# Patient Record
Sex: FEMALE | Race: BLACK OR AFRICAN AMERICAN | Employment: OTHER | URBAN - METROPOLITAN AREA
[De-identification: names, ages, dates, MRNs, and addresses within clinical notes are randomized per-mention and may not be internally consistent; named-entity substitution may affect disease eponyms.]

---

## 2017-01-04 DIAGNOSIS — I10 ESSENTIAL HYPERTENSION: Primary | ICD-10-CM

## 2017-01-04 RX ORDER — LISINOPRIL 5 MG/1
TABLET ORAL
Qty: 30 TAB | Refills: 5 | Status: SHIPPED | OUTPATIENT
Start: 2017-01-04 | End: 2017-03-07 | Stop reason: SDUPTHER

## 2017-01-05 RX ORDER — LISINOPRIL 5 MG/1
TABLET ORAL
Qty: 30 TAB | Refills: 3 | Status: SHIPPED | OUTPATIENT
Start: 2017-01-05 | End: 2017-03-07 | Stop reason: SDUPTHER

## 2017-02-15 ENCOUNTER — TELEPHONE (OUTPATIENT)
Dept: ENDOCRINOLOGY | Age: 52
End: 2017-02-15

## 2017-02-15 NOTE — TELEPHONE ENCOUNTER
Patient says she started taking 1/2 pill of Nasina on Monday. Patient says she is is experiencing weight loss in particular muscle mass and thinks  it from medication.

## 2017-02-15 NOTE — TELEPHONE ENCOUNTER
Marino Shiver by itself should not cause weight loss , helps with the sugars and is weight neutral     Is there any thing she is taking with it ?     She can either continue and see what happens or stop for 2 weeks and restart

## 2017-02-16 LAB
ALBUMIN SERPL-MCNC: 4.3 G/DL (ref 3.5–5.5)
ALBUMIN/CREAT UR: 10.1 MG/G CREAT (ref 0–30)
ALBUMIN/GLOB SERPL: 1.5 {RATIO} (ref 1.1–2.5)
ALP SERPL-CCNC: 55 IU/L (ref 39–117)
ALT SERPL-CCNC: 39 IU/L (ref 0–32)
AST SERPL-CCNC: 27 IU/L (ref 0–40)
BILIRUB SERPL-MCNC: 0.4 MG/DL (ref 0–1.2)
BUN SERPL-MCNC: 9 MG/DL (ref 6–24)
BUN/CREAT SERPL: 18 (ref 9–23)
CALCIUM SERPL-MCNC: 10 MG/DL (ref 8.7–10.2)
CHLORIDE SERPL-SCNC: 101 MMOL/L (ref 96–106)
CHOLEST SERPL-MCNC: 107 MG/DL (ref 100–199)
CO2 SERPL-SCNC: 23 MMOL/L (ref 18–29)
CREAT SERPL-MCNC: 0.5 MG/DL (ref 0.57–1)
CREAT UR-MCNC: 89.1 MG/DL
EST. AVERAGE GLUCOSE BLD GHB EST-MCNC: 143 MG/DL
GLOBULIN SER CALC-MCNC: 2.8 G/DL (ref 1.5–4.5)
GLUCOSE SERPL-MCNC: 117 MG/DL (ref 65–99)
HBA1C MFR BLD: 6.6 % (ref 4.8–5.6)
HDLC SERPL-MCNC: 55 MG/DL
INTERPRETATION, 910389: NORMAL
LDLC SERPL CALC-MCNC: 34 MG/DL (ref 0–99)
Lab: NORMAL
MICROALBUMIN UR-MCNC: 9 UG/ML
POTASSIUM SERPL-SCNC: 4.6 MMOL/L (ref 3.5–5.2)
PROT SERPL-MCNC: 7.1 G/DL (ref 6–8.5)
SODIUM SERPL-SCNC: 141 MMOL/L (ref 134–144)
TRIGL SERPL-MCNC: 91 MG/DL (ref 0–149)
TSH SERPL DL<=0.005 MIU/L-ACNC: <0.006 UIU/ML (ref 0.45–4.5)
VLDLC SERPL CALC-MCNC: 18 MG/DL (ref 5–40)

## 2017-02-16 NOTE — TELEPHONE ENCOUNTER
Please see if you cad FT4 , if not she has to com [employer]     This could be the reason for her weight loss - thyroid is fast

## 2017-02-16 NOTE — TELEPHONE ENCOUNTER
Called and informed pt of Dr. Kaiden Lopez note. Pt verbalized understanding and stated she has been taking half a pill for now. She may stop med and restart as advised. She has appt in 3 weeks and will discuss further then.

## 2017-02-23 ENCOUNTER — TELEPHONE (OUTPATIENT)
Dept: ENDOCRINOLOGY | Age: 52
End: 2017-02-23

## 2017-02-23 DIAGNOSIS — R94.6 ABNORMAL THYROID FUNCTION TEST: ICD-10-CM

## 2017-02-23 DIAGNOSIS — R79.89 ABNORMAL THYROID BLOOD TEST: Primary | ICD-10-CM

## 2017-02-23 NOTE — TELEPHONE ENCOUNTER
----- Message from Lon Lynn MD sent at 2/16/2017  5:26 PM EST -----  Please see if you cad FT4 , if not she has to com [email or web address]     This could be the reason for her weight loss - thyroid is fast

## 2017-03-07 ENCOUNTER — OFFICE VISIT (OUTPATIENT)
Dept: ENDOCRINOLOGY | Age: 52
End: 2017-03-07

## 2017-03-07 VITALS
BODY MASS INDEX: 23.71 KG/M2 | SYSTOLIC BLOOD PRESSURE: 132 MMHG | RESPIRATION RATE: 16 BRPM | WEIGHT: 133.8 LBS | HEIGHT: 63 IN | DIASTOLIC BLOOD PRESSURE: 74 MMHG | TEMPERATURE: 98.3 F | HEART RATE: 112 BPM

## 2017-03-07 DIAGNOSIS — Z79.4 UNCONTROLLED TYPE 2 DIABETES MELLITUS WITH HYPERGLYCEMIA, WITH LONG-TERM CURRENT USE OF INSULIN (HCC): ICD-10-CM

## 2017-03-07 DIAGNOSIS — E11.65 UNCONTROLLED TYPE 2 DIABETES MELLITUS WITH HYPERGLYCEMIA, WITH LONG-TERM CURRENT USE OF INSULIN (HCC): ICD-10-CM

## 2017-03-07 DIAGNOSIS — E11.65 UNCONTROLLED TYPE 2 DIABETES MELLITUS WITH HYPERGLYCEMIA, WITHOUT LONG-TERM CURRENT USE OF INSULIN (HCC): ICD-10-CM

## 2017-03-07 DIAGNOSIS — E11.65 TYPE 2 DIABETES MELLITUS WITH HYPERGLYCEMIA, WITHOUT LONG-TERM CURRENT USE OF INSULIN (HCC): ICD-10-CM

## 2017-03-07 DIAGNOSIS — I10 ESSENTIAL HYPERTENSION: ICD-10-CM

## 2017-03-07 DIAGNOSIS — E11.65 TYPE 2 DIABETES MELLITUS WITH HYPERGLYCEMIA, WITHOUT LONG-TERM CURRENT USE OF INSULIN (HCC): Primary | ICD-10-CM

## 2017-03-07 DIAGNOSIS — E05.90 HYPERTHYROIDISM: ICD-10-CM

## 2017-03-07 RX ORDER — LISINOPRIL 5 MG/1
TABLET ORAL
Qty: 30 TAB | Refills: 11 | Status: SHIPPED | OUTPATIENT
Start: 2017-03-07 | End: 2017-10-18 | Stop reason: SDUPTHER

## 2017-03-07 RX ORDER — METFORMIN HYDROCHLORIDE 1000 MG/1
TABLET ORAL
Qty: 60 TAB | Refills: 11 | Status: SHIPPED | OUTPATIENT
Start: 2017-03-07 | End: 2017-10-18 | Stop reason: SDUPTHER

## 2017-03-07 RX ORDER — LANCETS
EACH MISCELLANEOUS
Qty: 100 EACH | Refills: 11 | Status: SHIPPED | OUTPATIENT
Start: 2017-03-07

## 2017-03-07 RX ORDER — ALOGLIPTIN 25 MG/1
25 TABLET, FILM COATED ORAL DAILY
Qty: 30 TAB | Refills: 11 | Status: SHIPPED | OUTPATIENT
Start: 2017-03-07 | End: 2017-11-10 | Stop reason: ALTCHOICE

## 2017-03-07 NOTE — PROGRESS NOTES
[facility]               Reinaldo Post MD        [address] [phone] Fax [phone]  [facility]) [address] [address] [address] [id number] Fax [phone] ( Monday)          Patient Information  Date:3/7/2017  Name : Raven Lindsey 46 y.o.     YOB: 1965         Referred by: Steffany Whittington MD         Chief Complaint   Patient presents with    Diabetes    Medication Management       History of Present Illness: Raven Lindsey is a 46 y.o. female here for follow up of  Type 2 Diabetes Mellitus. She was diagnosed with diabetes in 2013   She  has lost weight   Racing of the heart   TSH was low and FT4 high     She thought Blair Prosper was making her lose weight          She is on Metformin 1000 mg twice daily     She has Apps on her phone to track calories which is helping          Wt Readings from Last 3 Encounters:   03/07/17 133 lb 12.8 oz (60.7 kg)   10/25/16 145 lb 8 oz (66 kg)   05/17/16 155 lb 14.4 oz (70.7 kg)       BP Readings from Last 3 Encounters:   03/07/17 132/74   10/25/16 119/85   05/17/16 121/69           Past Medical History:   Diagnosis Date    [facility]     HTN (hypertension)      Current Outpatient Prescriptions   Medication Sig    lisinopril (PRINIVIL, ZESTRIL) 5 mg tablet TAKE ONE TABLET BY MOUTH DAILY    biotin 2,500 mcg tab Take 1 Tab by mouth daily.  alogliptin (NESINA) 25 mg tablet Take 1 Tab by mouth daily.  metFORMIN (GLUCOPHAGE) 1,000 mg tablet TAKE ONE TABLET BY MOUTH TWICE A DAY WITH MEALS    glucose blood VI test strips (ONETOUCH VERIO) strip Test blood glucose daily    Lancets misc Test blood glucose once daily    ALOE VERA Take 1 Cap by mouth daily. No current facility-administered medications for this visit.       No Known Allergies      Review of Systems:  - Constitutional Symptoms: no fevers, no chills  - Eyes: no blurry vision no double vision  - Cardiovascular: no chest pain ,no palpitations  - Respiratory: no cough no shortness of breath  - Gastrointestinal: no dysphagia no  abdominal pain  - Musculoskeletal: no joint pains no  weakness  - Integumentary: no rashes  -     Physical Examination:   Blood pressure 132/74, pulse (!) 112, temperature 98.3 °F (36.8 °C), temperature source Oral, resp. rate 16, height 5' 3\" (1.6 m), weight 133 lb 12.8 oz (60.7 kg). Estimated body mass index is 23.7 kg/(m^2) as calculated from the following:    Height as of this encounter: 5' 3\" (1.6 m). -   Weight as of this encounter: 133 lb 12.8 oz (60.7 kg). - General: pleasant, no distress, good eye contact  - HEENT: no pallor, no periorbital edema, EOMI  - Neck: supple, no thyromegaly  - Cardiovascular: regular, tachycardia  normal S1 and S2  - Respiratory: clear to auscultation bilaterally  - Gastrointestinal: soft, nontender, nondistended,  BS +  - Musculoskeletal:no acanthosis nigricans  - Neurological: ,alert and oriented  - Psychiatric: normal mood and affect  - Skin: color, texture, turgor normal.       Data Reviewed:     [] Glucose records reviewed. [] See glucose records for details (to be scanned).   [] A1C  [] Reviewed labs  Lab Results   Component Value Date/Time    Hemoglobin A1c 6.6 02/15/2017 10:59 AM    Hemoglobin A1c 6.6 10/18/2016 08:28 AM    Hemoglobin A1c 6.9 05/10/2016 08:41 AM    Glucose 117 02/15/2017 10:59 AM    Glucose  12/01/2014 01:15 PM    Microalb/Creat ratio (ug/mg creat.) 10.1 02/15/2017 10:59 AM    LDL,Direct 64 05/10/2016 08:41 AM    LDL, calculated 34 02/15/2017 10:59 AM    Creatinine 0.50 02/15/2017 10:59 AM      Lab Results   Component Value Date/Time    Cholesterol, total 107 02/15/2017 10:59 AM    HDL Cholesterol 55 02/15/2017 10:59 AM    LDL,Direct 64 05/10/2016 08:41 AM    LDL, calculated 34 02/15/2017 10:59 AM    Triglyceride 91 02/15/2017 10:59 AM     Lab Results   Component Value Date/Time    ALT (SGPT) 39 02/15/2017 10:59 AM    AST (SGOT) 27 02/15/2017 10:59 AM    Alk. phosphatase 55 02/15/2017 10:59 AM    Bilirubin, total 0.4 02/15/2017 10:59 AM     Lab Results   Component Value Date/Time    GFR est  02/15/2017 10:59 AM    GFR est non- 02/15/2017 10:59 AM    Creatinine 0.50 02/15/2017 10:59 AM    BUN 9 02/15/2017 10:59 AM    Sodium 141 02/15/2017 10:59 AM    Potassium 4.6 02/15/2017 10:59 AM    Chloride 101 02/15/2017 10:59 AM    CO2 23 02/15/2017 10:59 AM      Lab Results   Component Value Date/Time    TSH <0.006 02/15/2017 10:59 AM        Assessment/Plan:     1. Type 2 diabetes mellitus with hyperglycemia, without long-term current use of insulin (HCC)        Diabetes:    Lab Results   Component Value Date/Time    Hemoglobin A1c 6.6 02/15/2017 10:59 AM    Hemoglobin A1c (POC) 7.8 12/01/2014 01:15 PM   Nesina,  Metformin  Life style changes, discussed goals of glucose and A1C     Hypertension:  On Lisinopril. Overweight - weight stable  Body mass index is 23.7 kg/(m^2). Vitamin D - dairy intake       Hyperthyroidism -   Uptake and scan   on some supplements according  and weight loss medication ,she denies taking it         There are no Patient Instructions on file for this visit. Follow-up Disposition: Not on File    Thank you for allowing me to participate in the care of this patient.     Apolinar Pino MD

## 2017-03-07 NOTE — PROGRESS NOTES
Alexandra Atkinson is a 46 y.o. female here for   Chief Complaint   Patient presents with    Diabetes    Medication Management       Functional glucose monitor and record keeping system? - yes  Eye exam within last year? - no   Foot exam within last year? - yes April 2016    Lab Results   Component Value Date/Time    Hemoglobin A1c 6.6 02/15/2017 10:59 AM    Hemoglobin A1c (POC) 7.8 12/01/2014 01:15 PM       Wt Readings from Last 3 Encounters:   10/25/16 145 lb 8 oz (66 kg)   05/17/16 155 lb 14.4 oz (70.7 kg)   04/18/16 153 lb 6.4 oz (69.6 kg)     Temp Readings from Last 3 Encounters:   10/25/16 97.9 °F (36.6 °C) (Oral)   05/17/16 98.2 °F (36.8 °C) (Oral)   02/24/16 98.4 °F (36.9 °C) (Oral)     BP Readings from Last 3 Encounters:   10/25/16 119/85   05/17/16 121/69   04/18/16 127/75     Pulse Readings from Last 3 Encounters:   10/25/16 (!) 115   05/17/16 90   04/18/16 83

## 2017-03-07 NOTE — MR AVS SNAPSHOT
Visit Information Date & Time Provider Department Dept. Phone Encounter #  
 3/7/2017  3:15 PM Pearline Severs, MD [facility] End[facility]ology [phone] [id number] Follow-up Instructions Return in about 6 weeks (around 4/18/2017). Upcoming Health Maintenance Date Due Hepatitis C Screening 1965 FOOT EXAM Q1 9/20/1975 Pneumococcal 19-64 Medium Risk (1 of 1 - PPSV23) 9/20/1984 DTaP/Tdap/Td series (1 - Tdap) 9/20/1986 PAP AKA CERVICAL CYTOLOGY 9/20/1986 INFLUENZA AGE 9 TO ADULT 8/1/2016 EYE EXAM RETINAL OR DILATED Q1 1/7/2017 HEMOGLOBIN A1C Q6M 8/15/2017 MICROALBUMIN Q1 2/15/2018 LIPID PANEL Q1 2/15/2018 BREAST CANCER SCRN MAMMOGRAM 11/16/2018 COLONOSCOPY 8/3/2021 Allergies as of 3/7/2017  Review Complete On: 3/7/2017 By: Pearline Severs, MD  
 No Known Allergies Current Immunizations  Never Reviewed No immunizations on file. Not reviewed this visit You Were Diagnosed With   
  
 Codes Comments Type 2 diabetes mellitus with hyperglycemia, without long-term current use of insulin (HCC)    -  Primary ICD-10-CM: E11.65 ICD-9-CM: 250.00, 790.29 Hyperthyroidism     ICD-10-CM: E05.90 ICD-9-CM: 242.90 Vitals BP Pulse Temp Resp Height(growth percentile) Weight(growth percentile) 132/74 (BP 1 Location: Left arm, BP Patient Position: Sitting) (!) 112 98.3 °F (36.8 °C) (Oral) 16 5' 3\" (1.6 m) 133 lb 12.8 oz (60.7 kg) BMI OB Status Smoking Status 23.7 kg/m2 Premenopausal Never Smoker BMI and BSA Data Body Mass Index Body Surface Area  
 23.7 kg/m 2 1.64 m 2 [facility] [address], [address] [phone] Your Updated Medication List  
  
   
This list is accurate as of: 3/7/17  4:09 PM.  Always use your most recent med list.  
  
  
  
  
 Nelson Macanese Take 1 Cap by mouth daily. alogliptin 25 mg tablet Commonly known as:  Devon Wagner Take 1 Tab by mouth daily. biotin 2,500 mcg Tab Take 1 Tab by mouth daily. glucose blood VI test strips strip Commonly known as:  Kayce Ahle Test blood glucose daily Lancets Misc Test blood glucose once daily  
  
 lisinopril 5 mg tablet Commonly known as:  PRINIVIL, ZESTRIL  
TAKE ONE TABLET BY MOUTH DAILY  
  
 metFORMIN 1,000 mg tablet Commonly known as:  GLUCOPHAGE  
TAKE ONE TABLET BY MOUTH TWICE A DAY WITH MEALS Follow-up Instructions Return in about 6 weeks (around 4/18/2017). Introducing [facility]! Tina Gordillo introduces CareWire patient portal. Now you can access parts of your medical record, email your doctor's office, and request medication refills online. 1. In your internet browser, go to [email or web address] 2. Click on the First Time User? Click Here link in the Sign In box. You will see the New Member Sign Up page. 3. Enter your CareWire Access Code exactly as it appears below. You will not need to use this code after youve completed the sign-up process. If you do not sign up before the expiration date, you must request a new code. · CareWire Access Code: [email or web address] Expires: 6/5/2017  4:09 PM 
 
4. Enter the last four digits of your Social Security Number (xxxx) and Date of Birth (mm/dd/yyyy) as indicated and click Submit. You will be taken to the next sign-up page. 5. Create a CareWire ID. This will be your CareWire login ID and cannot be changed, so think of one that is secure and easy to remember. 6. Create a CareWire password. You can change your password at any time. 7. Enter your Password Reset Question and Answer. This can be used at a later time if you forget your password. 8. Enter your e-mail address. You will receive e-mail notification when new information is available in [address] 9. Click Sign Up. You can now view and download portions of your medical record. 10. Click the Download Summary menu link to download a portable copy of your medical information. If you have questions, please visit the Frequently Asked Questions section of the streamit website. Remember, streamit is NOT to be used for urgent needs. For medical emergencies, dial 911. Now available from your iPhone and Android! Please provide this summary of care documentation to your next provider. Your primary care clinician is listed as Adenike Medina. If you have any questions after today's visit, please call [phone].

## 2017-04-10 ENCOUNTER — HOSPITAL ENCOUNTER (OUTPATIENT)
Dept: NUCLEAR MEDICINE | Age: 52
Discharge: HOME | End: 2017-04-10
Attending: INTERNAL MEDICINE
Payer: COMMERCIAL

## 2017-04-10 DIAGNOSIS — E11.65 TYPE 2 DIABETES MELLITUS WITH HYPERGLYCEMIA, WITHOUT LONG-TERM CURRENT USE OF INSULIN (HCC): ICD-10-CM

## 2017-04-10 DIAGNOSIS — E05.90 HYPERTHYROIDISM: ICD-10-CM

## 2017-04-10 DIAGNOSIS — I10 ESSENTIAL HYPERTENSION: ICD-10-CM

## 2017-04-11 ENCOUNTER — HOSPITAL ENCOUNTER (OUTPATIENT)
Dept: ULTRASOUND IMAGING | Age: 52
Discharge: HOME | End: 2017-04-11
Attending: INTERNAL MEDICINE
Payer: COMMERCIAL

## 2017-04-11 ENCOUNTER — HOSPITAL ENCOUNTER (OUTPATIENT)
Dept: NUCLEAR MEDICINE | Age: 52
Discharge: HOME | End: 2017-04-11
Attending: INTERNAL MEDICINE
Payer: COMMERCIAL

## 2017-04-11 DIAGNOSIS — E05.90 HYPERTHYROIDISM: ICD-10-CM

## 2017-04-11 PROCEDURE — 78014 THYROID IMAGING W/BLOOD FLOW: CPT

## 2017-04-12 DIAGNOSIS — E05.90 HYPERTHYROIDISM: Primary | ICD-10-CM

## 2017-04-12 NOTE — TELEPHONE ENCOUNTER
Attempted to call. Unsuccessful. Left msg for Naheed Tomas to give us a call back at the office. A callback number was left.

## 2017-04-12 NOTE — TELEPHONE ENCOUNTER
----- Message from Ishmael Olson MD sent at 4/12/2017  9:18 AM EDT -----  She has overactive thyroid and need medications to control   Methimazole 30 mg in AM - all 3 tabs in AM  Labs BNv TSH ,FT4    FU in 6 - 8 weeks

## 2017-04-13 RX ORDER — METHIMAZOLE 10 MG/1
30 TABLET ORAL DAILY
Qty: 90 TAB | Refills: 5 | Status: SHIPPED | OUTPATIENT
Start: 2017-04-13 | End: 2017-06-12 | Stop reason: SDUPTHER

## 2017-04-13 NOTE — TELEPHONE ENCOUNTER
Per Dr. Henrietta Ferrer, informed pt of result note. Pt verbalized understanding with no further questions or concerns at this time. Transferred pt to  to schedule f/u and labs.   Order placed for pt per verbal order with read back from Dr. Henrietta Ferrer 04/13/17

## 2017-04-14 ENCOUNTER — OFFICE VISIT (OUTPATIENT)
Dept: ENDOCRINOLOGY | Age: 52
End: 2017-04-14

## 2017-04-14 VITALS
WEIGHT: 130.3 LBS | TEMPERATURE: 98.5 F | HEART RATE: 130 BPM | SYSTOLIC BLOOD PRESSURE: 144 MMHG | HEIGHT: 63 IN | BODY MASS INDEX: 23.09 KG/M2 | RESPIRATION RATE: 16 BRPM | DIASTOLIC BLOOD PRESSURE: 73 MMHG

## 2017-04-14 DIAGNOSIS — I10 ESSENTIAL HYPERTENSION: ICD-10-CM

## 2017-04-14 DIAGNOSIS — E11.65 TYPE 2 DIABETES MELLITUS WITH HYPERGLYCEMIA, WITHOUT LONG-TERM CURRENT USE OF INSULIN (HCC): Primary | ICD-10-CM

## 2017-04-14 DIAGNOSIS — E05.00 GRAVES DISEASE: ICD-10-CM

## 2017-04-14 NOTE — PROGRESS NOTES
Carla Duron is a 46 y.o. female here for   Chief Complaint   Patient presents with    Thyroid Problem     would like to discuss yesterdays thyroid scan       Wt Readings from Last 3 Encounters:   03/07/17 133 lb 12.8 oz (60.7 kg)   10/25/16 145 lb 8 oz (66 kg)   05/17/16 155 lb 14.4 oz (70.7 kg)     Temp Readings from Last 3 Encounters:   03/07/17 98.3 °F (36.8 °C) (Oral)   10/25/16 97.9 °F (36.6 °C) (Oral)   05/17/16 98.2 °F (36.8 °C) (Oral)     BP Readings from Last 3 Encounters:   03/07/17 132/74   10/25/16 119/85   05/17/16 121/69     Pulse Readings from Last 3 Encounters:   03/07/17 (!) 112   10/25/16 (!) 115   05/17/16 90

## 2017-04-14 NOTE — MR AVS SNAPSHOT
Visit Information Date & Time Provider Department Dept. Phone Encounter #  
 4/14/2017  1:30 PM Dimas Zavala MD [facility] [phone] [id number] Follow-up Instructions Return in about 2 months (around 6/14/2017). Your Appointments 4/21/2017  9:00 AM  
PHYSICAL with MD Reji Yoo Paigeolga Knight [address] [place] [address]) Appt Note: CPE  
 [address] [place] [address]  
[place] [address]  
  
    
 6/5/2017  8:45 AM  
LAB with CDE NURSE [facility]) Appt Note: lab  
 [address] [place] [address] [address] [facility] [address]  
[phone]  
  
   
 [address] [id number]  
  
    
 6/12/2017 10:15 AM  
ROUTINE CARE with Dimas Zavala MD  
[facility]) Appt Note: fu  thyroid [address] [place] [address] [address] [facility] [address]  
[phone]  
  
   
 [address] [place] [address] Upcoming Health Maintenance Date Due Hepatitis C Screening 1965 FOOT EXAM Q1 9/20/1975 Pneumococcal 19-64 Medium Risk (1 of 1 - PPSV23) 9/20/1984 DTaP/Tdap/Td series (1 - Tdap) 9/20/1986 PAP AKA CERVICAL CYTOLOGY 9/20/1986 INFLUENZA AGE 9 TO ADULT 8/1/2016 EYE EXAM RETINAL OR DILATED Q1 1/7/2017 HEMOGLOBIN A1C Q6M 8/15/2017 MICROALBUMIN Q1 2/15/2018 LIPID PANEL Q1 2/15/2018 BREAST CANCER SCRN MAMMOGRAM 11/16/2018 COLONOSCOPY 8/3/2021 Allergies as of 4/14/2017  Review Complete On: 4/14/2017 By: Dimas Zavala MD  
 No Known Allergies Current Immunizations  Never Reviewed No immunizations on file. Not reviewed this visit You Were Diagnosed With   
  
 Codes Comments Graves disease    -  Primary ICD-10-CM: E05.00 ICD-9-CM: 242.00 Vitals BP Pulse Temp Resp Height(growth percentile) Weight(growth percentile) 144/73 (BP 1 Location: Right arm, BP Patient Position: Sitting) (!) 130 98.5 °F (36.9 °C) (Oral) 16 5' 3\" (1.6 m) 130 lb 4.8 oz (59.1 kg) BMI OB Status Smoking Status 23.08 kg/m2 Premenopausal Never Smoker Vitals History BMI and BSA Data Body Mass Index Body Surface Area 23.08 kg/m 2 1.62 m 2 Preferred Pharmacy Pharmacy Name Phone Emily Zarate [address], [address] [phone] Your Updated Medication List  
  
   
This list is accurate as of: 4/14/17  1:45 PM.  Always use your most recent med list.  
  
  
  
  
 Sammie Heir Take 1 Cap by mouth daily. alogliptin 25 mg tablet Commonly known as:  Jose Salm Take 1 Tab by mouth daily. biotin 2,500 mcg Tab Take 1 Tab by mouth daily. glucose blood VI test strips strip Commonly known as:  Amrik Hamming Test blood glucose daily Lancets Misc Test blood glucose once daily  
  
 lisinopril 5 mg tablet Commonly known as:  PRINIVIL, ZESTRIL  
TAKE ONE TABLET BY MOUTH DAILY  
  
 metFORMIN 1,000 mg tablet Commonly known as:  GLUCOPHAGE  
TAKE ONE TABLET BY MOUTH TWICE A DAY WITH MEALS  
  
 methIMAzole 10 mg tablet Commonly known as:  TAPAZOLE Take 3 Tabs by mouth daily. Follow-up Instructions Return in about 2 months (around 6/14/2017). Please provide this summary of care documentation to your next provider. Your primary care clinician is listed as Thiago Shukla. If you have any questions after today's visit, please call [phone].

## 2017-04-14 NOTE — PROGRESS NOTES
[facility]               Kristine Loomis MD        [address] [phone] Fax [phone]  [facility])        [address] [place] [place] [address] [id number] Fax [phone] ( Monday)          Patient Information  Date:4/15/2017  Name : Juan Silva 46 y.o.     YOB: 1965         Referred by: Vivienne Halsted, MD         Chief Complaint   Patient presents with    Thyroid Problem     would like to discuss yesterdays thyroid scan       History of Present Illness: Juan Silva is a 46 y.o. female here for follow up of  Type 2 Diabetes Mellitus. She was diagnosed with diabetes in 2013   She  has lost weight   Racing of the heart   TSH was low and FT4 high     She thought Jocelyn Duran was making her lose weight          She is on Metformin 1000 mg twice daily     She has Apps on her phone to track calories which is helping          Wt Readings from Last 3 Encounters:   04/14/17 130 lb 4.8 oz (59.1 kg)   03/07/17 133 lb 12.8 oz (60.7 kg)   10/25/16 145 lb 8 oz (66 kg)       BP Readings from Last 3 Encounters:   04/14/17 144/73   03/07/17 132/74   10/25/16 119/85           Past Medical History:   Diagnosis Date    Diabetes ([facility]     HTN (hypertension)      Current Outpatient Prescriptions   Medication Sig    metFORMIN (GLUCOPHAGE) 1,000 mg tablet TAKE ONE TABLET BY MOUTH TWICE A DAY WITH MEALS    lisinopril (PRINIVIL, ZESTRIL) 5 mg tablet TAKE ONE TABLET BY MOUTH DAILY    Lancets misc Test blood glucose once daily    glucose blood VI test strips (ONETOUCH VERIO) strip Test blood glucose daily    methIMAzole (TAPAZOLE) 10 mg tablet Take 3 Tabs by mouth daily.  alogliptin (NESINA) 25 mg tablet Take 1 Tab by mouth daily.  biotin 2,500 mcg tab Take 1 Tab by mouth daily.  ALOE VERA Take 1 Cap by mouth daily. No current facility-administered medications for this visit.       No Known Allergies      Review of Systems:  - Constitutional Symptoms: no fevers, no chills  - Eyes: no blurry vision no double vision  - Cardiovascular: no chest pain ,no palpitations  - Respiratory: no cough no shortness of breath  - Gastrointestinal: no dysphagia no  abdominal pain  - Musculoskeletal: no joint pains no  weakness  - Integumentary: no rashes  -     Physical Examination:   Blood pressure 144/73, pulse (!) 130, temperature 98.5 °F (36.9 °C), temperature source Oral, resp. rate 16, height 5' 3\" (1.6 m), weight 130 lb 4.8 oz (59.1 kg). Estimated body mass index is 23.08 kg/(m^2) as calculated from the following:    Height as of this encounter: 5' 3\" (1.6 m). -   Weight as of this encounter: 130 lb 4.8 oz (59.1 kg). - General: pleasant, no distress, good eye contact  - HEENT: no pallor, no periorbital edema, EOMI  - Neck: supple, no thyromegaly  - Cardiovascular: regular, tachycardia  normal S1 and S2  - Respiratory: clear to auscultation bilaterally  - Gastrointestinal: soft, nontender, nondistended,  BS +  - Musculoskeletal:no acanthosis nigricans  - Neurological: ,alert and oriented  - Psychiatric: normal mood and affect  - Skin: color, texture, turgor normal.       Data Reviewed:     [] Glucose records reviewed. [] See glucose records for details (to be scanned).   [] A1C  [] Reviewed labs  Lab Results   Component Value Date/Time    Hemoglobin A1c 6.6 02/15/2017 10:59 AM    Hemoglobin A1c 6.6 10/18/2016 08:28 AM    Hemoglobin A1c 6.9 05/10/2016 08:41 AM    Glucose 117 02/15/2017 10:59 AM    Glucose  12/01/2014 01:15 PM    Microalb/Creat ratio (ug/mg creat.) 10.1 02/15/2017 10:59 AM    LDL,Direct 64 05/10/2016 08:41 AM    LDL, calculated 34 02/15/2017 10:59 AM    Creatinine 0.50 02/15/2017 10:59 AM      Lab Results   Component Value Date/Time    Cholesterol, total 107 02/15/2017 10:59 AM    HDL Cholesterol 55 02/15/2017 10:59 AM    LDL,Direct 64 05/10/2016 08:41 AM    LDL, calculated 34 02/15/2017 10:59 AM    Triglyceride 91 02/15/2017 10:59 AM     Lab Results   Component Value Date/Time    ALT (SGPT) 39 02/15/2017 10:59 AM    AST (SGOT) 27 02/15/2017 10:59 AM    Alk. phosphatase 55 02/15/2017 10:59 AM    Bilirubin, total 0.4 02/15/2017 10:59 AM     Lab Results   Component Value Date/Time    GFR est  02/15/2017 10:59 AM    GFR est non- 02/15/2017 10:59 AM    Creatinine 0.50 02/15/2017 10:59 AM    BUN 9 02/15/2017 10:59 AM    Sodium 141 02/15/2017 10:59 AM    Potassium 4.6 02/15/2017 10:59 AM    Chloride 101 02/15/2017 10:59 AM    CO2 23 02/15/2017 10:59 AM      Lab Results   Component Value Date/Time    TSH <0.006 02/15/2017 10:59 AM        Assessment/Plan:     1. Type 2 diabetes mellitus with hyperglycemia, without long-term current use of insulin ([address]    2. Graves disease    3. Essential hypertension        Diabetes:    Lab Results   Component Value Date/Time    Hemoglobin A1c 6.6 02/15/2017 10:59 AM    Hemoglobin A1c (POC) 7.8 12/01/2014 01:15 PM   Nesina,  Metformin  Life style changes, discussed goals of glucose and A1C     Hypertension:  On Lisinopril. Overweight - weight stable  Body mass index is 23.08 kg/(m^2). Vitamin D - dairy intake       Graves disease   Methimazole 30 mg 4/17        There are no Patient Instructions on file for this visit. Follow-up Disposition:  Return in about 2 months (around 6/14/2017). Thank you for allowing me to participate in the care of this patient.     Lluvia Cramer MD

## 2017-04-15 PROBLEM — E11.65 TYPE 2 DIABETES MELLITUS WITH HYPERGLYCEMIA, WITHOUT LONG-TERM CURRENT USE OF INSULIN (HCC): Status: ACTIVE | Noted: 2017-04-15

## 2017-04-21 ENCOUNTER — OFFICE VISIT (OUTPATIENT)
Dept: FAMILY MEDICINE CLINIC | Age: 52
End: 2017-04-21

## 2017-04-21 VITALS
SYSTOLIC BLOOD PRESSURE: 116 MMHG | BODY MASS INDEX: 22.64 KG/M2 | RESPIRATION RATE: 16 BRPM | HEART RATE: 118 BPM | HEIGHT: 63 IN | OXYGEN SATURATION: 98 % | WEIGHT: 127.8 LBS | DIASTOLIC BLOOD PRESSURE: 76 MMHG | TEMPERATURE: 98.6 F

## 2017-04-21 DIAGNOSIS — I10 ESSENTIAL HYPERTENSION: ICD-10-CM

## 2017-04-21 DIAGNOSIS — E05.90 HYPERTHYROIDISM: Primary | ICD-10-CM

## 2017-04-21 DIAGNOSIS — E11.65 TYPE 2 DIABETES MELLITUS WITH HYPERGLYCEMIA, WITHOUT LONG-TERM CURRENT USE OF INSULIN (HCC): ICD-10-CM

## 2017-04-21 RX ORDER — NEOMYCIN SULFATE, POLYMYXIN B SULFATE, HYDROCORTISONE 3.5; 10000; 1 MG/ML; [USP'U]/ML; MG/ML
SOLUTION/ DROPS AURICULAR (OTIC)
COMMUNITY
Start: 2017-03-29 | End: 2017-06-12 | Stop reason: ALTCHOICE

## 2017-04-21 NOTE — MR AVS SNAPSHOT
Visit Information Date & Time Provider Department Dept. Phone Encounter #  
 4/21/2017  9:00 AM Berny Patterson MD [address] [facility] [id number] Follow-up Instructions Return in about 3 months (around 7/21/2017). Your Appointments 6/5/2017  8:45 AM  
LAB with CDE NURSE [facility] [address] [place] [address]) Appt Note: lab  
 [address] [place] [address] [address] [facility] [address]  
[phone]  
  
   
 [address] [place] [id number]  
  
    
 6/12/2017 10:15 AM  
ROUTINE CARE with Luiz Kang MD  
[facility] [address] [place] [address]) Appt Note: fu  thyroid [address] [place] [address] [address] [facility] [address]  
[phone]  
  
   
 [address] [place] [address] Upcoming Health Maintenance Date Due Hepatitis C Screening 1965 FOOT EXAM Q1 9/20/1975 Pneumococcal 19-64 Medium Risk (1 of 1 - PPSV23) 9/20/1984 DTaP/Tdap/Td series (1 - Tdap) 9/20/1986 EYE EXAM RETINAL OR DILATED Q1 1/7/2017 HEMOGLOBIN A1C Q6M 8/15/2017 MICROALBUMIN Q1 2/15/2018 LIPID PANEL Q1 2/15/2018 BREAST CANCER SCRN MAMMOGRAM 11/16/2018 PAP AKA CERVICAL CYTOLOGY 7/19/2019 COLONOSCOPY 8/3/2021 Allergies as of 4/21/2017  Review Complete On: 4/21/2017 By: Berny Patterson MD  
 No Known Allergies Current Immunizations  Never Reviewed No immunizations on file. Not reviewed this visit You Were Diagnosed With   
  
 Codes Comments Hyperthyroidism    -  Primary ICD-10-CM: E05.90 ICD-9-CM: 242.90 Type 2 diabetes mellitus with hyperglycemia, without long-term current use of insulin (HCC)     ICD-10-CM: E11.65 ICD-9-CM: 250.00, 790.29 Essential hypertension     ICD-10-CM: I10 
ICD-9-CM: 401.9 Vitals BP Pulse Temp Resp Height(growth percentile) Weight(growth percentile) 116/76 (BP 1 Location: Left arm, BP Patient Position: Sitting) (!) 118 98.6 °F (37 °C) (Oral) 16 5' 3\" (1.6 m) 127 lb 12.8 oz (58 kg) SpO2 BMI OB Status Smoking Status 98% 22.64 kg/m2 Premenopausal Never Smoker Vitals History BMI and BSA Data Body Mass Index Body Surface Area  
 22.64 kg/m 2 1.61 m 2 Preferred Pharmacy Pharmacy Name Phone [facility] [address], [address] [phone] Your Updated Medication List  
  
   
This list is accurate as of: 4/21/17  9:50 AM.  Always use your most recent med list.  
  
  
  
  
 Marcelle Clause Take 1 Cap by mouth daily. alogliptin 25 mg tablet Commonly known as:  Conchita Fling Take 1 Tab by mouth daily. biotin 2,500 mcg Tab Take 1 Tab by mouth daily. glucose blood VI test strips strip Commonly known as:  Ok Lathe Test blood glucose daily Lancets Misc Test blood glucose once daily  
  
 lisinopril 5 mg tablet Commonly known as:  PRINIVIL, ZESTRIL  
TAKE ONE TABLET BY MOUTH DAILY  
  
 metFORMIN 1,000 mg tablet Commonly known as:  GLUCOPHAGE  
TAKE ONE TABLET BY MOUTH TWICE A DAY WITH MEALS  
  
 methIMAzole 10 mg tablet Commonly known as:  TAPAZOLE Take 3 Tabs by mouth daily. neomycin-polymyxin-hydrocortisone otic solution Commonly known as:  CORTISPORIN We Performed the Following REFERRAL TO ENDOCRINOLOGY [FVQ33 Custom] Comments:  
 eval and treat for hyperthyroid Follow-up Instructions Return in about 3 months (around 7/21/2017). Referral Information Referral ID Referred By Referred To  
  
 [id number] Amee Sprague MD [address] Phone: [phone] Fax: [phone] Visits Status Start Date End Date 1 New Request 4/21/17 4/21/18  If your referral has a status of pending review or denied, additional information will be sent to support the outcome of this decision. Patient Instructions Diabetes Foot Health: Care Instructions Your Care Instructions When you have diabetes, your feet need extra care and attention. Diabetes can damage the nerve endings and blood vessels in your feet, making you less likely to notice when your feet are injured. Diabetes also limits your body's ability to fight infection and get blood to areas that need it. If you get a minor foot injury, it could become an ulcer or a serious infection. With good foot care, you can prevent most of these problems. Caring for your feet can be quick and easy. Most of the care can be done when you are bathing or getting ready for bed. Follow-up care is a key part of your treatment and safety. Be sure to make and go to all appointments, and call your doctor if you are having problems. Its also a good idea to know your test results and keep a list of the medicines you take. How can you care for yourself at home? · Keep your blood sugar close to normal by watching what and how much you eat, monitoring blood sugar, taking medicines if prescribed, and getting regular exercise. · Do not smoke. Smoking affects blood flow and can make foot problems worse. If you need help quitting, talk to your doctor about stop-smoking programs and medicines. These can increase your chances of quitting for good. · Eat a diet that is low in fats. High fat intake can cause fat to build up in your blood vessels and decrease blood flow. · Inspect your feet daily for blisters, cuts, cracks, or sores. If you cannot see well, use a mirror or have someone help you. · Take care of your feet: 
[facility] your feet every day. Use warm (not hot) water. Check the water temperature with your wrists or other part of your body, not your feet. ¨ Dry your feet well. Pat them dry.  Do not rub the skin on your feet too hard. Dry well between your toes. If the skin on your feet stays moist, bacteria or a fungus can grow, which can lead to infection. ¨ Keep your skin soft. Use moisturizing skin cream to keep the skin on your feet soft and prevent calluses and cracks. But do not put the cream between your toes, and stop using any cream that causes a rash. ¨ Clean underneath your toenails carefully. Do not use a sharp object to clean underneath your toenails. Use the blunt end of a nail file or other rounded tool. ¨ Trim and file your toenails straight across to prevent ingrown toenails. Use a nail clipper, not scissors. Use an emery board to smooth the edges. · Change socks daily. Socks without seams are best, because seams often rub the feet. You can find socks for people with diabetes from specialty catalogs. · Look inside your shoes every day for things like gravel or torn linings, which could cause blisters or sores. · Buy shoes that fit well: 
¨ Look for shoes that have plenty of space around the toes. This helps prevent bunions and blisters. ¨ Try on shoes while wearing the kind of socks you will usually wear with the shoes. ¨ Avoid plastic shoes. They may rub your feet and cause blisters. Good shoes should be made of materials that are flexible and breathable, such as leather or cloth. ¨ Break in new shoes slowly by wearing them for no more than an hour a day for several days. Take extra time to check your feet for red areas, blisters, or other problems after you wear new shoes. · Do not go barefoot. Do not wear sandals, and do not wear shoes with very thin soles. Thin soles are easy to puncture. They also do not protect your feet from hot pavement or cold weather. · Have your doctor check your feet during each visit. If you have a foot problem, see your doctor. Do not try to treat an early foot problem at home.  Home remedies or treatments that you can buy without a prescription (such as corn removers) can be harmful. · Always get early treatment for foot problems. A minor irritation can lead to a major problem if not properly cared for early. When should you call for help? Call your doctor now or seek immediate medical care if: 
· You have a foot sore, an ulcer or break in the skin that is not healing after 4 days, bleeding corns or calluses, or an ingrown toenail. · You have blue or black areas, which can mean bruising or blood flow problems. · You have peeling skin or tiny blisters between your toes or cracking or oozing of the skin. · You have a fever for more than 24 hours and a foot sore. · You have new numbness or tingling in your feet that does not go away after you move your feet or change positions. · You have unexplained or unusual swelling of the foot or ankle. Watch closely for changes in your health, and be sure to contact your doctor if: 
· You cannot do proper foot care. Where can you learn more? Go to http://kenzie-jayro.info/. Enter A739 in the search box to learn more about \"Diabetes Foot Health: Care Instructions. \" Current as of: May 23, 2016 Content Version: 11.2 © 5734-4330 [employer]. Care instructions adapted under license by [employer] (which disclaims liability or warranty for this information). If you have questions about a medical condition or this instruction, always ask your healthcare professional. [place] [address] any warranty or liability for your use of this information. Learning About Diabetes and Your Teeth How does diabetes affect your teeth and gums? When you have diabetes, managing blood sugar levels and taking good care of your teeth and gums are both important. When blood sugar levels are high, there's a greater risk for: · Gum (periodontal) disease. · Tooth decay. · Fungal infections in the mouth, like thrush. · Dry mouth, or xerostomia (say \"zee-ruh-STO-tatianna-uh\"). The mouth needs saliva to neutralize the acids in your mouth. These acids can lead to gum disease and tooth decay. Keeping your blood sugar levels in your target range can help prevent problems with the teeth and gums. If you have any problems with your teeth or gums, see your dentist. 
How do you care for your teeth and gums when you have diabetes? · Brush your teeth twice a day. · Floss daily. Make sure to press the floss against your teeth and not your gums. · Check each day for areas where your gums might be red or painful. Be sure to let your dentist know of any sores in your mouth. · See your dentist regularly for professional cleaning of your teeth and to look for gum problems. Many dentists recommend getting checkups twice a year. Remind your dentist that you have diabetes before any work is done. · Don't smoke or use smokeless tobacco. Tobacco use with diabetes can lead to a greater risk of severe gum disease. If you need help quitting, talk to your doctor about stop-smoking programs and medicines. These can increase your chances of quitting for good. Follow-up care is a key part of your treatment and safety. Be sure to make and go to all appointments, and call your doctor if you are having problems. It's also a good idea to know your test results and keep a list of the medicines you take. Where can you learn more? Go to http://kenzie-jayro.info/. Enter H421 in the search box to learn more about \"Learning About Diabetes and Your Teeth. \" 
Current as of: May 23, 2016 Content Version: 11.2 © 6325-5639 [employer]ippy. Care instructions adapted under license by 1[employer] (which disclaims liability or warranty for this information).  If you have questions about a medical condition or this instruction, always ask your healthcare professional. [employer] disclaims any warranty or liability for your use of this information. Tdap (Tetanus, Diphtheria, Pertussis) Vaccine: What You Need to Know Why get vaccinated? Tetanus, diphtheria, and pertussis are very serious diseases. Tdap vaccine can protect us from these diseases. And Tdap vaccine given to pregnant women can protect  babies against pertussis. Tetanus (lockjaw) is rare in the [facility] today. It causes painful muscle tightening and stiffness, usually all over the body. · It can lead to tightening of muscles in the head and neck so you can't open your mouth, swallow, or sometimes even breathe. Tetanus kills about 1 out of 10 people who are infected even after receiving the best medical care. Diphtheria is also rare in the [place] today. It can cause a thick coating to form in the back of the throat. · It can lead to breathing problems, heart failure, paralysis, and death. Pertussis (whooping cough) causes severe coughing spells, which can cause difficulty breathing, vomiting, and disturbed sleep. · It can also lead to weight loss, incontinence, and rib fractures. Up to 2 in 100 adolescents and 5 in 100 adults with pertussis are hospitalized or have complications, which could include pneumonia or death. These diseases are caused by bacteria. Diphtheria and pertussis are spread from person to person through secretions from coughing or sneezing. Tetanus enters the body through cuts, scratches, or wounds. Before vaccines, as many as 200,000 cases of diphtheria, 200,000 cases of pertussis, and hundreds of cases of tetanus were reported in the [place] each year. Since vaccination began, reports of cases for tetanus and diphtheria have dropped by about 99% and for pertussis by about 80%. Tdap vaccine The Tdap vaccine can protect adolescents and adults from tetanus, diphtheria, and pertussis.  One dose of Tdap is routinely given at age 6 or 12. People who did not get Tdap at that age should get it as soon as possible. Tdap is especially important for health care professionals and anyone having close contact with a baby younger than 12 months. Pregnant women should get a dose of Tdap during every pregnancy, to protect the  from pertussis. Infants are most at risk for severe, life-threatening complications from pertussis. Another vaccine, called Td, protects against tetanus and diphtheria, but not pertussis. A Td booster should be given every 10 years. Tdap may be given as one of these boosters if you have never gotten Tdap before. Tdap may also be given after a severe cut or burn to prevent tetanus infection. Your doctor or the person giving you the vaccine can give you more information. Tdap may safely be given at the same time as other vaccines. Some people should not get this vaccine · A person who has ever had a life-threatening allergic reaction after a previous dose of any diphtheria-, tetanus-, or pertussis-containing vaccine, OR has a severe allergy to any part of this vaccine, should not get Tdap vaccine. Tell the person giving the vaccine about any severe allergies. · Anyone who had coma or long repeated seizures within 7 days after a childhood dose of DTP or DTaP, or a previous dose of Tdap, should not get Tdap, unless a cause other than the vaccine was found. They can still get Td. · Talk to your doctor if you: 
¨ Have seizures or another nervous system problem. ¨ Had severe pain or swelling after any vaccine containing diphtheria, tetanus, or pertussis. ¨ Ever had a condition called Guillain-Barré Syndrome (GBS). ¨ Aren't feeling well on the day the shot is scheduled. Risks With any medicine, including vaccines, there is a chance of side effects. These are usually mild and go away on their own. Serious reactions are also possible but are rare. Most people who get Tdap vaccine do not have any problems with it. Mild problems following Tdap 
(Did not interfere with activities) · Pain where the shot was given (about 3 in 4 adolescents or 2 in 3 adults) · Redness or swelling where the shot was given (about 1 person in 5) · Mild fever of at least 100.4°F (up to about 1 in 25 adolescents or 1 in 100 adults) · Headache (about 3 or 4 people in 10) · Tiredness (about 1 person in 3 or 4) · Nausea, vomiting, diarrhea, stomachache (up to 1 in 4 adolescents or 1 in 10 adults) · Chills, sore joints (about 1 person in 10) · Body aches (about 1 person in 3 or 4) · Rash, swollen glands (uncommon) Moderate problems following Tdap (Interfered with activities, but did not require medical attention) · Pain where the shot was given (up to 1 in 5 or 6) · Redness or swelling where the shot was given (up to about 1 in 16 adolescents or 1 in 12 adults) · Fever over 102°F (about 1 in 100 adolescents or 1 in 250 adults) · Headache (about 1 in 7 adolescents or 1 in 10 adults) · Nausea, vomiting, diarrhea, stomachache (up to 1 to 3 people in 100) · Swelling of the entire arm where the shot was given (up to about 1 in 500) Severe problems following Tdap 
(Unable to perform usual activities; required medical attention) · Swelling, severe pain, bleeding and redness in the arm where the shot was given (rare) Problems that could happen after any vaccine: · People sometimes faint after a medical procedure, including vaccination. Sitting or lying down for about 15 minutes can help prevent fainting, and injuries caused by a fall. Tell your doctor if you feel dizzy or have vision changes or ringing in the ears. · Some people get severe pain in the shoulder and have difficulty moving the arm where a shot was given. This happens very rarely. · Any medication can cause a severe allergic reaction.  Such reactions from a vaccine are very rare, estimated at fewer than 1 in a million doses, and would happen within a few minutes to a few hours after the vaccination. As with any medicine, there is a very remote chance of a vaccine causing a serious injury or death. The safety of vaccines is always being monitored. For more information, visit: www.cdc.gov/vaccinesafety. What if there is a serious problem? What should I look for? · Look for anything that concerns you, such as signs of a severe allergic reaction, very high fever, or unusual behavior. Signs of a severe allergic reaction can include hives, swelling of the face and throat, difficulty breathing, a fast heartbeat, dizziness, and weakness. These would usually start a few minutes to a few hours after the vaccination. What should I do? · If you think it is a severe allergic reaction or other emergency that can't wait, call 9-1-1 or get the person to the nearest hospital. Otherwise, call your doctor. · Afterward, the reaction should be reported to the Vaccine Adverse Event Reporting System (VAERS). Your doctor might file this report, or you can do it yourself through the VAERS web site at www.vaers. hhs.gov, or by calling 5-829.941.4152. VAERS does not give medical advice. The National Vaccine Injury Compensation Program 
The National Vaccine Injury Compensation Program (VICP) is a federal program that was created to compensate people who may have been injured by certain vaccines. Persons who believe they may have been injured by a vaccine can learn about the program and about filing a claim by calling 1-142.667.3157 or visiting the [employer] website at www.[facility].gov/vaccinecompensation. There is a time limit to file a claim for compensation. How can I learn more? · Ask your doctor. He or she can give you the vaccine package insert or suggest other sources of information. · Call your local or state health department.  
· Contact the Centers for Disease Control and Prevention (CDC): 
¨ Call 5-138.680.4658 (1-800-CDC-INFO) or 
 ¨ Visit CDC's website at www.cdc.gov/vaccines Vaccine Information Statement (Interim) Tdap Vaccine 
(2/24/15) 42 WAL[address] 355KS-31 [facility] [place] and [facility] [place] Centers for Disease Control and Prevention Many Vaccine Information Statements are available in [place] and other languages. See www.immunize.org/vis. Muchas hojas de información sobre vacunas están disponibles en español y en otros idiomas. Visite www.immunize.org/vis. Care instructions adapted under license by your healthcare professional. If you have questions about a medical condition or this instruction, always ask your healthcare professional. [place] [address] any warranty or liability for your use of this information. Pneumococcal Polysaccharide Vaccine: What You Need to Know Why get vaccinated? Vaccination can protect older adults (and some children and younger adults) from pneumococcal disease. Pneumococcal disease is caused by bacteria that can spread from person to person through close contact. It can cause ear infections, and it can also lead to more serious infections of the: 
· Lungs (pneumonia), · Blood (bacteremia), and 
· Covering of the brain and spinal cord (meningitis). Meningitis can cause deafness and brain damage, and it can be fatal. 
Anyone can get pneumococcal disease, but children under 3years of age, people with certain medical conditions, adults over 72years of age, and cigarette smokers are at the highest risk. About 18,000 older adults die each year from pneumococcal disease in the [place]. Treatment of pneumococcal infections with penicillin and other drugs used to be more effective. But some strains of the disease have become resistant to these drugs. This makes prevention of the disease, through vaccination, even more important. Pneumococcal polysaccharide vaccine (PPSV23) Pneumococcal polysaccharide vaccine (PPSV23) protects against 23 types of pneumococcal bacteria. It will not prevent all pneumococcal disease. PPSV23 is recommended for: · All adults 72years of age and older, · Anyone 2 through 59years of age with certain long-term health problems, 
· Anyone 2 through 59years of age with a weakened immune system, 
· Adults 23 through 59years of age who smoke cigarettes or have asthma. Most people need only one dose of PPSV. A second dose is recommended for certain high-risk groups. People 72 and older should get a dose even if they have gotten one or more doses of the vaccine before they turned 65. Your healthcare provider can give you more information about these recommendations. Most healthy adults develop protection within 2 to 3 weeks of getting the shot. Some people should not get this vaccine · Anyone who has had a life-threatening allergic reaction to PPSV should not get another dose. · Anyone who has a severe allergy to any component of PPSV should not receive it. Tell your provider if you have any severe allergies. · Anyone who is moderately or severely ill when the shot is scheduled may be asked to wait until they recover before getting the vaccine. Someone with a mild illness can usually be vaccinated. · Children less than 3years of age should not receive this vaccine. · There is no evidence that PPSV is harmful to either a pregnant woman or to her fetus. However, as a precaution, women who need the vaccine should be vaccinated before becoming pregnant, if possible. Risks of a vaccine reaction With any medicine, including vaccines, there is a chance of side effects. These are usually mild and go away on their own, but serious reactions are also possible. About half of people who get PPSV have mild side effects, such as redness or pain where the shot is given, which go away within about two days. Less than 1 out of 100 people develop a fever, muscle aches, or more severe local reactions. Problems that could happen after any vaccine: · People sometimes faint after a medical procedure, including vaccination. Sitting or lying down for about 15 minutes can help prevent fainting, and injuries caused by a fall. Tell your doctor if you feel dizzy, or have vision changes or ringing in the ears. · Some people get severe pain in the shoulder and have difficulty moving the arm where a shot was given. This happens very rarely. · Any medication can cause a severe allergic reaction. Such reactions from a vaccine are very rare, estimated at about 1 in a million doses, and would happen within a few minutes to a few hours after the vaccination. As with any medicine, there is a very remote chance of a vaccine causing a serious injury or death. The safety of vaccines is always being monitored. For more information, visit: www.cdc.gov/vaccinesafety/ What if there is a serious reaction? What should I look for? Look for anything that concerns you, such as signs of a severe allergic reaction, very high fever, or unusual behavior. Signs of a severe allergic reaction can include hives, swelling of the face and throat, difficulty breathing, a fast heartbeat, dizziness, and weakness. These would usually start a few minutes to a few hours after the vaccination. What should I do? If you think it is a severe allergic reaction or other emergency that can't wait, call 9-1-1 or get to the nearest hospital. Otherwise, call your doctor. Afterward, the reaction should be reported to the Vaccine Adverse Event Reporting System (VAERS). Your doctor might file this report, or you can do it yourself through the VAERS web site at www.vaers. [facility].gov, or by calling 7-979.644.6598. VAERS does not give medical advice. How can I learn more? · Ask your doctor. He or she can give you the vaccine package insert or suggest other sources of information. · Call your local or state health department. · Contact the Centers for Disease Control and Prevention (CDC): 
¨ Call 2-154.111.2900 (1-800-CDC-INFO) or ¨ Visit CDC's website at www.cdc.gov/vaccines Vaccine Information Statement PPSV Vaccine 
(04/24/2015) Department of Health and [employer] Centers for Disease Control and Prevention Many Vaccine Information Statements are available in [place] and other languages. See www.immunize.org/vis. Hojas de información Sobre Vacunas están disponibles en español y en muchos otros idiomas. [employer]ite [employer]. Care instructions adapted under license by your healthcare professional. If you have questions about a medical condition or this instruction, always ask your healthcare professional. [address] any warranty or liability for your use of this information. Please provide this summary of care documentation to your next provider. Your primary care clinician is listed as Ruth Goss. If you have any questions after today's visit, please call [phone].

## 2017-04-21 NOTE — PROGRESS NOTES
Chief Complaint   Patient presents with    Annual Exam     1. Have you been to the ER, urgent care clinic since your last visit? Hospitalized since your last visit? Yes, Urgent Care 03/23/17, Ear infection    2. Have you seen or consulted any other health care providers outside of the [address] [place] [address] since your last visit? Include any pap smears or colon screening.  No

## 2017-04-21 NOTE — PROGRESS NOTES
Chief Complaint   Patient presents with    Annual Exam     she is a 46y.o. year old female who presents for evalution. She wants to get the opinion of a different doc about taking the methimazole  She had blood work in [place]. She does not want to have to take methimazole the rest of her life. She does not want radioactive iodine either. She wants to see if another doc has another option    Reviewed PmHx, RxHx, FmHx, SocHx, AllgHx and updated and dated in the chart.     Patient Active Problem List    Diagnosis    Type 2 diabetes mellitus with hyperglycemia, without long-term current use of insulin ([facility] [address]    BMI 28.0-28.9,adult    Overweight    Type II diabetes mellitus, uncontrolled ([facility] [address]    Essential hypertension    Vitamin D deficiency    Overweight    Type II or unspecified type diabetes mellitus without mention of complication, uncontrolled       Nurse notes were reviewed and copied and are correct  Review of Systems - negative except as listed above in the HPI    Objective:     Vitals:    04/21/17 0914   BP: 116/76   Pulse: (!) 118   Resp: 16   Temp: 98.6 °F (37 °C)   TempSrc: Oral   SpO2: 98%   Weight: 127 lb 12.8 oz (58 kg)   Height: 5' 3\" (1.6 m)       Physical Examination: General appearance - alert, well appearing, and in no distress and oriented to person, place, and time  Mental status - alert, oriented to person, place, and time  Ears - bilateral TM's and external ear canals normal  Mouth - mucous membranes moist, pharynx normal without lesions  Neck - supple, no significant adenopathy  Lymphatics - no palpable lymphadenopathy, no hepatosplenomegaly  Chest - clear to auscultation, no wheezes, rales or rhonchi, symmetric air entry  Heart - normal rate, regular rhythm, normal S1, S2, no murmurs, rubs, clicks or gallops  Abdomen - soft, nontender, nondistended, no masses or organomegaly  Neurological - alert, oriented, normal speech, no focal findings or movement disorder noted  Musculoskeletal - no joint tenderness, deformity or swelling  Extremities - peripheral pulses normal, no pedal edema, no clubbing or cyanosis  Skin - normal coloration and turgor, no rashes, no suspicious skin lesions noted      Assessment/ Plan:   Efe Stewart was seen today for annual exam.    Diagnoses and all orders for this visit:    Hyperthyroidism  -     REFERRAL TO ENDOCRINOLOGY  Her heart rate is rapid. She just started taking the methimazole yesterday. I explained the heart rate is likely a result of the hyperthyroid. I am making the referal that she is requesting  Type 2 diabetes mellitus with hyperglycemia, without long-term current use of insulin (HCC)  Well controlled now  Essential hypertension     well controlled. No c/o negative side effects from any meds  Follow-up Disposition:  Return in about 3 months (around 7/21/2017). ICD-10-CM ICD-9-CM    1. Hyperthyroidism E05.90 242.90 REFERRAL TO ENDOCRINOLOGY   2. Type 2 diabetes mellitus with hyperglycemia, without long-term current use of insulin (HCC) E11.65 250.00      790.29    3. Essential hypertension I10 401.9        I have discussed the diagnosis with the patient and the intended plan as seen in the above orders. The patient has received an after-visit summary and questions were answered concerning future plans. Medication Side Effects and Warnings were discussed with patient: yes  Patient Labs were reviewed and or requested: yes  Patient Past Records were reviewed and or requested: yes        Patient Instructions        Diabetes Foot Health: Care Instructions  Your Care Instructions    When you have diabetes, your feet need extra care and attention. Diabetes can damage the nerve endings and blood vessels in your feet, making you less likely to notice when your feet are injured. Diabetes also limits your body's ability to fight infection and get blood to areas that need it. If you get a minor foot injury, it could become an ulcer or a serious infection.  With good foot care, you can prevent most of these problems. Caring for your feet can be quick and easy. Most of the care can be done when you are bathing or getting ready for bed. Follow-up care is a key part of your treatment and safety. Be sure to make and go to all appointments, and call your doctor if you are having problems. Its also a good idea to know your test results and keep a list of the medicines you take. How can you care for yourself at home? · Keep your blood sugar close to normal by watching what and how much you eat, monitoring blood sugar, taking medicines if prescribed, and getting regular exercise. · Do not smoke. Smoking affects blood flow and can make foot problems worse. If you need help quitting, talk to your doctor about stop-smoking programs and medicines. These can increase your chances of quitting for good. · Eat a diet that is low in fats. High fat intake can cause fat to build up in your blood vessels and decrease blood flow. · Inspect your feet daily for blisters, cuts, cracks, or sores. If you cannot see well, use a mirror or have someone help you. · Take care of your feet:  [facility] your feet every day. Use warm (not hot) water. Check the water temperature with your wrists or other part of your body, not your feet. ¨ Dry your feet well. Pat them dry. Do not rub the skin on your feet too hard. Dry well between your toes. If the skin on your feet stays moist, bacteria or a fungus can grow, which can lead to infection. ¨ Keep your skin soft. Use moisturizing skin cream to keep the skin on your feet soft and prevent calluses and cracks. But do not put the cream between your toes, and stop using any cream that causes a rash. ¨ Clean underneath your toenails carefully. Do not use a sharp object to clean underneath your toenails. Use the blunt end of a nail file or other rounded tool. ¨ Trim and file your toenails straight across to prevent ingrown toenails.  Use a nail clipper, not scissors. Use an emery board to smooth the edges. · Change socks daily. Socks without seams are best, because seams often rub the feet. You can find socks for people with diabetes from specialty catalogs. · Look inside your shoes every day for things like gravel or torn linings, which could cause blisters or sores. · Buy shoes that fit well:  ¨ Look for shoes that have plenty of space around the toes. This helps prevent bunions and blisters. ¨ Try on shoes while wearing the kind of socks you will usually wear with the shoes. ¨ Avoid plastic shoes. They may rub your feet and cause blisters. Good shoes should be made of materials that are flexible and breathable, such as leather or cloth. ¨ Break in new shoes slowly by wearing them for no more than an hour a day for several days. Take extra time to check your feet for red areas, blisters, or other problems after you wear new shoes. · Do not go barefoot. Do not wear sandals, and do not wear shoes with very thin soles. Thin soles are easy to puncture. They also do not protect your feet from hot pavement or cold weather. · Have your doctor check your feet during each visit. If you have a foot problem, see your doctor. Do not try to treat an early foot problem at home. Home remedies or treatments that you can buy without a prescription (such as corn removers) can be harmful. · Always get early treatment for foot problems. A minor irritation can lead to a major problem if not properly cared for early. When should you call for help? Call your doctor now or seek immediate medical care if:  · You have a foot sore, an ulcer or break in the skin that is not healing after 4 days, bleeding corns or calluses, or an ingrown toenail. · You have blue or black areas, which can mean bruising or blood flow problems. · You have peeling skin or tiny blisters between your toes or cracking or oozing of the skin. · You have a fever for more than 24 hours and a foot sore.   · You have new numbness or tingling in your feet that does not go away after you move your feet or change positions. · You have unexplained or unusual swelling of the foot or ankle. Watch closely for changes in your health, and be sure to contact your doctor if:  · You cannot do proper foot care. Where can you learn more? Go to http://kenzie-jayro.info/. Enter A739 in the search box to learn more about \"Diabetes Foot Health: Care Instructions. \"  Current as of: May 23, 2016  Content Version: 11.2  © 0107-5331 [employer]. Care instructions adapted under license by [employer] (which disclaims liability or warranty for this information). If you have questions about a medical condition or this instruction, always ask your healthcare professional. [address] any warranty or liability for your use of this information. Learning About Diabetes and Your Teeth  How does diabetes affect your teeth and gums? When you have diabetes, managing blood sugar levels and taking good care of your teeth and gums are both important. When blood sugar levels are high, there's a greater risk for:  · Gum (periodontal) disease. · Tooth decay. · Fungal infections in the mouth, like thrush. · Dry mouth, or xerostomia (say \"zee-ruh-STO-tatianna-uh\"). The mouth needs saliva to neutralize the acids in your mouth. These acids can lead to gum disease and tooth decay. Keeping your blood sugar levels in your target range can help prevent problems with the teeth and gums. If you have any problems with your teeth or gums, see your dentist.  How do you care for your teeth and gums when you have diabetes? · Brush your teeth twice a day. · Floss daily. Make sure to press the floss against your teeth and not your gums. · Check each day for areas where your gums might be red or painful. Be sure to let your dentist know of any sores in your mouth.   · See your dentist regularly for professional cleaning of your teeth and to look for gum problems. Many dentists recommend getting checkups twice a year. Remind your dentist that you have diabetes before any work is done. · Don't smoke or use smokeless tobacco. Tobacco use with diabetes can lead to a greater risk of severe gum disease. If you need help quitting, talk to your doctor about stop-smoking programs and medicines. These can increase your chances of quitting for good. Follow-up care is a key part of your treatment and safety. Be sure to make and go to all appointments, and call your doctor if you are having problems. It's also a good idea to know your test results and keep a list of the medicines you take. Where can you learn more? Go to http://kenzie-jayro.info/. Enter H601 in the search box to learn more about \"Learning About Diabetes and Your Teeth. \"  Current as of: May 23, 2016  Content Version: 11.2  © 6611-6602 [employer]. Care instructions adapted under license by [employer] (which disclaims liability or warranty for this information). If you have questions about a medical condition or this instruction, always ask your healthcare professional. [place] [address] any warranty or liability for your use of this information. Tdap (Tetanus, Diphtheria, Pertussis) Vaccine: What You Need to Know  Why get vaccinated? Tetanus, diphtheria, and pertussis are very serious diseases. Tdap vaccine can protect us from these diseases. And Tdap vaccine given to pregnant women can protect  babies against pertussis. Tetanus (lockjaw) is rare in the [facility] today. It causes painful muscle tightening and stiffness, usually all over the body. · It can lead to tightening of muscles in the head and neck so you can't open your mouth, swallow, or sometimes even breathe.  Tetanus kills about 1 out of 10 people who are infected even after receiving the best medical care.  Diphtheria is also rare in the [facility] today. It can cause a thick coating to form in the back of the throat. · It can lead to breathing problems, heart failure, paralysis, and death. Pertussis (whooping cough) causes severe coughing spells, which can cause difficulty breathing, vomiting, and disturbed sleep. · It can also lead to weight loss, incontinence, and rib fractures. Up to 2 in 100 adolescents and 5 in 100 adults with pertussis are hospitalized or have complications, which could include pneumonia or death. These diseases are caused by bacteria. Diphtheria and pertussis are spread from person to person through secretions from coughing or sneezing. Tetanus enters the body through cuts, scratches, or wounds. Before vaccines, as many as 200,000 cases of diphtheria, 200,000 cases of pertussis, and hundreds of cases of tetanus were reported in the [place] each year. Since vaccination began, reports of cases for tetanus and diphtheria have dropped by about 99% and for pertussis by about 80%. Tdap vaccine  The Tdap vaccine can protect adolescents and adults from tetanus, diphtheria, and pertussis. One dose of Tdap is routinely given at age 6 or 15. People who did not get Tdap at that age should get it as soon as possible. Tdap is especially important for health care professionals and anyone having close contact with a baby younger than 12 months. Pregnant women should get a dose of Tdap during every pregnancy, to protect the  from pertussis. Infants are most at risk for severe, life-threatening complications from pertussis. Another vaccine, called Td, protects against tetanus and diphtheria, but not pertussis. A Td booster should be given every 10 years. Tdap may be given as one of these boosters if you have never gotten Tdap before. Tdap may also be given after a severe cut or burn to prevent tetanus infection.   Your doctor or the person giving you the vaccine can give you more information. Tdap may safely be given at the same time as other vaccines. Some people should not get this vaccine  · A person who has ever had a life-threatening allergic reaction after a previous dose of any diphtheria-, tetanus-, or pertussis-containing vaccine, OR has a severe allergy to any part of this vaccine, should not get Tdap vaccine. Tell the person giving the vaccine about any severe allergies. · Anyone who had coma or long repeated seizures within 7 days after a childhood dose of DTP or DTaP, or a previous dose of Tdap, should not get Tdap, unless a cause other than the vaccine was found. They can still get Td. · Talk to your doctor if you:  ¨ Have seizures or another nervous system problem. ¨ Had severe pain or swelling after any vaccine containing diphtheria, tetanus, or pertussis. ¨ Ever had a condition called Guillain-Barré Syndrome (GBS). ¨ Aren't feeling well on the day the shot is scheduled. Risks  With any medicine, including vaccines, there is a chance of side effects. These are usually mild and go away on their own. Serious reactions are also possible but are rare. Most people who get Tdap vaccine do not have any problems with it.   Mild problems following Tdap  (Did not interfere with activities)  · Pain where the shot was given (about 3 in 4 adolescents or 2 in 3 adults)  · Redness or swelling where the shot was given (about 1 person in 5)  · Mild fever of at least 100.4°F (up to about 1 in 25 adolescents or 1 in 100 adults)  · Headache (about 3 or 4 people in 10)  · Tiredness (about 1 person in 3 or 4)  · Nausea, vomiting, diarrhea, stomachache (up to 1 in 4 adolescents or 1 in 10 adults)  · Chills, sore joints (about 1 person in 10)  · Body aches (about 1 person in 3 or 4)  · Rash, swollen glands (uncommon)  Moderate problems following Tdap  (Interfered with activities, but did not require medical attention)  · Pain where the shot was given (up to 1 in 5 or 6)  · Redness or swelling where the shot was given (up to about 1 in 16 adolescents or 1 in 12 adults)  · Fever over 102°F (about 1 in 100 adolescents or 1 in 250 adults)  · Headache (about 1 in 7 adolescents or 1 in 10 adults)  · Nausea, vomiting, diarrhea, stomachache (up to 1 to 3 people in 100)  · Swelling of the entire arm where the shot was given (up to about 1 in 500)  Severe problems following Tdap  (Unable to perform usual activities; required medical attention)  · Swelling, severe pain, bleeding and redness in the arm where the shot was given (rare)  Problems that could happen after any vaccine:  · People sometimes faint after a medical procedure, including vaccination. Sitting or lying down for about 15 minutes can help prevent fainting, and injuries caused by a fall. Tell your doctor if you feel dizzy or have vision changes or ringing in the ears. · Some people get severe pain in the shoulder and have difficulty moving the arm where a shot was given. This happens very rarely. · Any medication can cause a severe allergic reaction. Such reactions from a vaccine are very rare, estimated at fewer than 1 in a million doses, and would happen within a few minutes to a few hours after the vaccination. As with any medicine, there is a very remote chance of a vaccine causing a serious injury or death. The safety of vaccines is always being monitored. For more information, visit: www.cdc.gov/vaccinesafety. What if there is a serious problem? What should I look for? · Look for anything that concerns you, such as signs of a severe allergic reaction, very high fever, or unusual behavior. Signs of a severe allergic reaction can include hives, swelling of the face and throat, difficulty breathing, a fast heartbeat, dizziness, and weakness. These would usually start a few minutes to a few hours after the vaccination. What should I do?   · If you think it is a severe allergic reaction or other emergency that can't wait, call 9-1-1 or get the person to the nearest hospital. Otherwise, call your doctor. · Afterward, the reaction should be reported to the Vaccine Adverse Event Reporting System (VAERS). Your doctor might file this report, or you can do it yourself through the VAERS web site at www.vaers. [facility].gov, or by calling 1-765.861.3322. VAERS does not give medical advice. The National Vaccine Injury Compensation Program  The National Vaccine Injury Compensation Program (VICP) is a federal program that was created to compensate people who may have been injured by certain vaccines. Persons who believe they may have been injured by a vaccine can learn about the program and about filing a claim by calling 0-822.898.2396 or visiting the [employer] Benefit Plans website at www.[facility].gov/vaccinecompensation. There is a time limit to file a claim for compensation. How can I learn more? · Ask your doctor. He or she can give you the vaccine package insert or suggest other sources of information. · Call your local or state health department. · Contact the Centers for Disease Control and Prevention (CDC):  ¨ Call 7-138.131.4753 (1-800-CDC-INFO) or  ¨ Visit CDC's website at www.cdc.gov/vaccines  Vaccine Information Statement (Interim)  Tdap Vaccine  (2/24/15)  42 [address] 480EA-15  Department of Health and Human Services  Centers for Disease Control and Prevention  Many Vaccine Information Statements are available in [place] and other languages. See www.immunize.org/vis. Muchas hojas de información sobre vacunas están disponibles en español y en otros idiomas. Visite www.immunize.org/vis. Care instructions adapted under license by your healthcare professional. If you have questions about a medical condition or this instruction, always ask your healthcare professional. [place] [address] any warranty or liability for your use of this information. Pneumococcal Polysaccharide Vaccine: What You Need to Know  Why get vaccinated?   Vaccination can protect older adults (and some children and younger adults) from pneumococcal disease. Pneumococcal disease is caused by bacteria that can spread from person to person through close contact. It can cause ear infections, and it can also lead to more serious infections of the:  · Lungs (pneumonia),  · Blood (bacteremia), and  · Covering of the brain and spinal cord (meningitis). Meningitis can cause deafness and brain damage, and it can be fatal.  Anyone can get pneumococcal disease, but children under 3years of age, people with certain medical conditions, adults over 72years of age, and cigarette smokers are at the highest risk. About 18,000 older adults die each year from pneumococcal disease in the [place]. Treatment of pneumococcal infections with penicillin and other drugs used to be more effective. But some strains of the disease have become resistant to these drugs. This makes prevention of the disease, through vaccination, even more important. Pneumococcal polysaccharide vaccine (PPSV23)  Pneumococcal polysaccharide vaccine (PPSV23) protects against 23 types of pneumococcal bacteria. It will not prevent all pneumococcal disease. PPSV23 is recommended for:  · All adults 72years of age and older,  · Anyone 2 through 59years of age with certain long-term health problems,  · Anyone 2 through 59years of age with a weakened immune system,  · Adults 23 through 59years of age who smoke cigarettes or have asthma. Most people need only one dose of PPSV. A second dose is recommended for certain high-risk groups. People 72 and older should get a dose even if they have gotten one or more doses of the vaccine before they turned 65. Your healthcare provider can give you more information about these recommendations. Most healthy adults develop protection within 2 to 3 weeks of getting the shot.   Some people should not get this vaccine  · Anyone who has had a life-threatening allergic reaction to PPSV should not get another dose. · Anyone who has a severe allergy to any component of PPSV should not receive it. Tell your provider if you have any severe allergies. · Anyone who is moderately or severely ill when the shot is scheduled may be asked to wait until they recover before getting the vaccine. Someone with a mild illness can usually be vaccinated. · Children less than 3years of age should not receive this vaccine. · There is no evidence that PPSV is harmful to either a pregnant woman or to her fetus. However, as a precaution, women who need the vaccine should be vaccinated before becoming pregnant, if possible. Risks of a vaccine reaction  With any medicine, including vaccines, there is a chance of side effects. These are usually mild and go away on their own, but serious reactions are also possible. About half of people who get PPSV have mild side effects, such as redness or pain where the shot is given, which go away within about two days. Less than 1 out of 100 people develop a fever, muscle aches, or more severe local reactions. Problems that could happen after any vaccine:  · People sometimes faint after a medical procedure, including vaccination. Sitting or lying down for about 15 minutes can help prevent fainting, and injuries caused by a fall. Tell your doctor if you feel dizzy, or have vision changes or ringing in the ears. · Some people get severe pain in the shoulder and have difficulty moving the arm where a shot was given. This happens very rarely. · Any medication can cause a severe allergic reaction. Such reactions from a vaccine are very rare, estimated at about 1 in a million doses, and would happen within a few minutes to a few hours after the vaccination. As with any medicine, there is a very remote chance of a vaccine causing a serious injury or death. The safety of vaccines is always being monitored.  For more information, visit: www.cdc.gov/vaccinesafety/  What if there is a serious reaction? What should I look for? Look for anything that concerns you, such as signs of a severe allergic reaction, very high fever, or unusual behavior. Signs of a severe allergic reaction can include hives, swelling of the face and throat, difficulty breathing, a fast heartbeat, dizziness, and weakness. These would usually start a few minutes to a few hours after the vaccination. What should I do? If you think it is a severe allergic reaction or other emergency that can't wait, call 9-1-1 or get to the nearest hospital. Otherwise, call your doctor. Afterward, the reaction should be reported to the Vaccine Adverse Event Reporting System (VAERS). Your doctor might file this report, or you can do it yourself through the VAERS web site at www.vaers. [facility].gov, or by calling 0-865.579.6374. VAERS does not give medical advice. How can I learn more? · Ask your doctor. He or she can give you the vaccine package insert or suggest other sources of information. · Call your local or state health department. · Contact the Centers for Disease Control and Prevention (CDC):  ¨ Call 0-655.160.6735 (1-800-CDC-INFO) or  ¨ Visit CDC's website at www.cdc.gov/vaccines  Vaccine Information Statement  PPSV Vaccine  (04/24/2015)  Department of Health and Human Services  Centers for Disease Control and Prevention  Many Vaccine Information Statements are available in [place]ian and other languages. See www.immunize.org/vis. Hojas de información Sobre Vacunas están disponibles en español y en muchos otros idiomas. Visite [email or web address].si. Care instructions adapted under license by your healthcare professional. If you have questions about a medical condition or this instruction, always ask your healthcare professional. [place] [address] any warranty or liability for your use of this information.         The patient verbalizes understanding and agrees with the plan of care        Patient has the advanced directives booklet to review

## 2017-04-21 NOTE — PATIENT INSTRUCTIONS
Diabetes Foot Health: Care Instructions  Your Care Instructions    When you have diabetes, your feet need extra care and attention. Diabetes can damage the nerve endings and blood vessels in your feet, making you less likely to notice when your feet are injured. Diabetes also limits your body's ability to fight infection and get blood to areas that need it. If you get a minor foot injury, it could become an ulcer or a serious infection. With good foot care, you can prevent most of these problems. Caring for your feet can be quick and easy. Most of the care can be done when you are bathing or getting ready for bed. Follow-up care is a key part of your treatment and safety. Be sure to make and go to all appointments, and call your doctor if you are having problems. Its also a good idea to know your test results and keep a list of the medicines you take. How can you care for yourself at home? · Keep your blood sugar close to normal by watching what and how much you eat, monitoring blood sugar, taking medicines if prescribed, and getting regular exercise. · Do not smoke. Smoking affects blood flow and can make foot problems worse. If you need help quitting, talk to your doctor about stop-smoking programs and medicines. These can increase your chances of quitting for good. · Eat a diet that is low in fats. High fat intake can cause fat to build up in your blood vessels and decrease blood flow. · Inspect your feet daily for blisters, cuts, cracks, or sores. If you cannot see well, use a mirror or have someone help you. · Take care of your feet:  [facility] your feet every day. Use warm (not hot) water. Check the water temperature with your wrists or other part of your body, not your feet. ¨ Dry your feet well. Pat them dry. Do not rub the skin on your feet too hard. Dry well between your toes. If the skin on your feet stays moist, bacteria or a fungus can grow, which can lead to infection.   ¨ Keep your skin soft. Use moisturizing skin cream to keep the skin on your feet soft and prevent calluses and cracks. But do not put the cream between your toes, and stop using any cream that causes a rash. ¨ Clean underneath your toenails carefully. Do not use a sharp object to clean underneath your toenails. Use the blunt end of a nail file or other rounded tool. ¨ Trim and file your toenails straight across to prevent ingrown toenails. Use a nail clipper, not scissors. Use an emery board to smooth the edges. · Change socks daily. Socks without seams are best, because seams often rub the feet. You can find socks for people with diabetes from specialty catalogs. · Look inside your shoes every day for things like gravel or torn linings, which could cause blisters or sores. · Buy shoes that fit well:  ¨ Look for shoes that have plenty of space around the toes. This helps prevent bunions and blisters. ¨ Try on shoes while wearing the kind of socks you will usually wear with the shoes. ¨ Avoid plastic shoes. They may rub your feet and cause blisters. Good shoes should be made of materials that are flexible and breathable, such as leather or cloth. ¨ Break in new shoes slowly by wearing them for no more than an hour a day for several days. Take extra time to check your feet for red areas, blisters, or other problems after you wear new shoes. · Do not go barefoot. Do not wear sandals, and do not wear shoes with very thin soles. Thin soles are easy to puncture. They also do not protect your feet from hot pavement or cold weather. · Have your doctor check your feet during each visit. If you have a foot problem, see your doctor. Do not try to treat an early foot problem at home. Home remedies or treatments that you can buy without a prescription (such as corn removers) can be harmful. · Always get early treatment for foot problems. A minor irritation can lead to a major problem if not properly cared for early.   When should you call for help? Call your doctor now or seek immediate medical care if:  · You have a foot sore, an ulcer or break in the skin that is not healing after 4 days, bleeding corns or calluses, or an ingrown toenail. · You have blue or black areas, which can mean bruising or blood flow problems. · You have peeling skin or tiny blisters between your toes or cracking or oozing of the skin. · You have a fever for more than 24 hours and a foot sore. · You have new numbness or tingling in your feet that does not go away after you move your feet or change positions. · You have unexplained or unusual swelling of the foot or ankle. Watch closely for changes in your health, and be sure to contact your doctor if:  · You cannot do proper foot care. Where can you learn more? Go to http://kenzie-jayro.info/. Enter A739 in the search box to learn more about \"Diabetes Foot Health: Care Instructions. \"  Current as of: May 23, 2016  Content Version: 11.2  © 6601-0301 [employer]. Care instructions adapted under license by [employer] (which disclaims liability or warranty for this information). If you have questions about a medical condition or this instruction, always ask your healthcare professional. [address] any warranty or liability for your use of this information. Learning About Diabetes and Your Teeth  How does diabetes affect your teeth and gums? When you have diabetes, managing blood sugar levels and taking good care of your teeth and gums are both important. When blood sugar levels are high, there's a greater risk for:  · Gum (periodontal) disease. · Tooth decay. · Fungal infections in the mouth, like thrush. · Dry mouth, or xerostomia (say \"zee-ruh-STO-tatianna-uh\"). The mouth needs saliva to neutralize the acids in your mouth. These acids can lead to gum disease and tooth decay.   Keeping your blood sugar levels in your target range can help prevent problems with the teeth and gums. If you have any problems with your teeth or gums, see your dentist.  How do you care for your teeth and gums when you have diabetes? · Brush your teeth twice a day. · Floss daily. Make sure to press the floss against your teeth and not your gums. · Check each day for areas where your gums might be red or painful. Be sure to let your dentist know of any sores in your mouth. · See your dentist regularly for professional cleaning of your teeth and to look for gum problems. Many dentists recommend getting checkups twice a year. Remind your dentist that you have diabetes before any work is done. · Don't smoke or use smokeless tobacco. Tobacco use with diabetes can lead to a greater risk of severe gum disease. If you need help quitting, talk to your doctor about stop-smoking programs and medicines. These can increase your chances of quitting for good. Follow-up care is a key part of your treatment and safety. Be sure to make and go to all appointments, and call your doctor if you are having problems. It's also a good idea to know your test results and keep a list of the medicines you take. Where can you learn more? Go to http://kenzie-jayro.info/. Enter L821 in the search box to learn more about \"Learning About Diabetes and Your Teeth. \"  Current as of: May 23, 2016  Content Version: 11.2  © 5839-7368 [employer]. Care instructions adapted under license by [employer] (which disclaims liability or warranty for this information). If you have questions about a medical condition or this instruction, always ask your healthcare professional. [place] [address] any warranty or liability for your use of this information. Tdap (Tetanus, Diphtheria, Pertussis) Vaccine: What You Need to Know  Why get vaccinated? Tetanus, diphtheria, and pertussis are very serious diseases.  Tdap vaccine can protect us from these diseases. And Tdap vaccine given to pregnant women can protect  babies against pertussis. Tetanus (lockjaw) is rare in the [facility] today. It causes painful muscle tightening and stiffness, usually all over the body. · It can lead to tightening of muscles in the head and neck so you can't open your mouth, swallow, or sometimes even breathe. Tetanus kills about 1 out of 10 people who are infected even after receiving the best medical care. Diphtheria is also rare in the [place] today. It can cause a thick coating to form in the back of the throat. · It can lead to breathing problems, heart failure, paralysis, and death. Pertussis (whooping cough) causes severe coughing spells, which can cause difficulty breathing, vomiting, and disturbed sleep. · It can also lead to weight loss, incontinence, and rib fractures. Up to 2 in 100 adolescents and 5 in 100 adults with pertussis are hospitalized or have complications, which could include pneumonia or death. These diseases are caused by bacteria. Diphtheria and pertussis are spread from person to person through secretions from coughing or sneezing. Tetanus enters the body through cuts, scratches, or wounds. Before vaccines, as many as 200,000 cases of diphtheria, 200,000 cases of pertussis, and hundreds of cases of tetanus were reported in the [place] each year. Since vaccination began, reports of cases for tetanus and diphtheria have dropped by about 99% and for pertussis by about 80%. Tdap vaccine  The Tdap vaccine can protect adolescents and adults from tetanus, diphtheria, and pertussis. One dose of Tdap is routinely given at age 6 or 15. People who did not get Tdap at that age should get it as soon as possible. Tdap is especially important for health care professionals and anyone having close contact with a baby younger than 12 months.   Pregnant women should get a dose of Tdap during every pregnancy, to protect the  from pertussis. Infants are most at risk for severe, life-threatening complications from pertussis. Another vaccine, called Td, protects against tetanus and diphtheria, but not pertussis. A Td booster should be given every 10 years. Tdap may be given as one of these boosters if you have never gotten Tdap before. Tdap may also be given after a severe cut or burn to prevent tetanus infection. Your doctor or the person giving you the vaccine can give you more information. Tdap may safely be given at the same time as other vaccines. Some people should not get this vaccine  · A person who has ever had a life-threatening allergic reaction after a previous dose of any diphtheria-, tetanus-, or pertussis-containing vaccine, OR has a severe allergy to any part of this vaccine, should not get Tdap vaccine. Tell the person giving the vaccine about any severe allergies. · Anyone who had coma or long repeated seizures within 7 days after a childhood dose of DTP or DTaP, or a previous dose of Tdap, should not get Tdap, unless a cause other than the vaccine was found. They can still get Td. · Talk to your doctor if you:  ¨ Have seizures or another nervous system problem. ¨ Had severe pain or swelling after any vaccine containing diphtheria, tetanus, or pertussis. ¨ Ever had a condition called Guillain-Barré Syndrome (GBS). ¨ Aren't feeling well on the day the shot is scheduled. Risks  With any medicine, including vaccines, there is a chance of side effects. These are usually mild and go away on their own. Serious reactions are also possible but are rare. Most people who get Tdap vaccine do not have any problems with it.   Mild problems following Tdap  (Did not interfere with activities)  · Pain where the shot was given (about 3 in 4 adolescents or 2 in 3 adults)  · Redness or swelling where the shot was given (about 1 person in 5)  · Mild fever of at least 100.4°F (up to about 1 in 25 adolescents or 1 in 100 adults)  · Headache (about 3 or 4 people in 10)  · Tiredness (about 1 person in 3 or 4)  · Nausea, vomiting, diarrhea, stomachache (up to 1 in 4 adolescents or 1 in 10 adults)  · Chills, sore joints (about 1 person in 10)  · Body aches (about 1 person in 3 or 4)  · Rash, swollen glands (uncommon)  Moderate problems following Tdap  (Interfered with activities, but did not require medical attention)  · Pain where the shot was given (up to 1 in 5 or 6)  · Redness or swelling where the shot was given (up to about 1 in 16 adolescents or 1 in 12 adults)  · Fever over 102°F (about 1 in 100 adolescents or 1 in 250 adults)  · Headache (about 1 in 7 adolescents or 1 in 10 adults)  · Nausea, vomiting, diarrhea, stomachache (up to 1 to 3 people in 100)  · Swelling of the entire arm where the shot was given (up to about 1 in 500)  Severe problems following Tdap  (Unable to perform usual activities; required medical attention)  · Swelling, severe pain, bleeding and redness in the arm where the shot was given (rare)  Problems that could happen after any vaccine:  · People sometimes faint after a medical procedure, including vaccination. Sitting or lying down for about 15 minutes can help prevent fainting, and injuries caused by a fall. Tell your doctor if you feel dizzy or have vision changes or ringing in the ears. · Some people get severe pain in the shoulder and have difficulty moving the arm where a shot was given. This happens very rarely. · Any medication can cause a severe allergic reaction. Such reactions from a vaccine are very rare, estimated at fewer than 1 in a million doses, and would happen within a few minutes to a few hours after the vaccination. As with any medicine, there is a very remote chance of a vaccine causing a serious injury or death. The safety of vaccines is always being monitored. For more information, visit: www.cdc.gov/vaccinesafety. What if there is a serious problem?   What should I look for?  · Look for anything that concerns you, such as signs of a severe allergic reaction, very high fever, or unusual behavior. Signs of a severe allergic reaction can include hives, swelling of the face and throat, difficulty breathing, a fast heartbeat, dizziness, and weakness. These would usually start a few minutes to a few hours after the vaccination. What should I do? · If you think it is a severe allergic reaction or other emergency that can't wait, call 9-1-1 or get the person to the nearest hospital. Otherwise, call your doctor. · Afterward, the reaction should be reported to the Vaccine Adverse Event Reporting System (VAERS). Your doctor might file this report, or you can do it yourself through the VAERS web site at www.vaers. hhs.gov, or by calling 2-384.305.8576. VAERS does not give medical advice. The National Vaccine Injury Compensation Program  The National Vaccine Injury Compensation Program (VICP) is a federal program that was created to compensate people who may have been injured by certain vaccines. Persons who believe they may have been injured by a vaccine can learn about the program and about filing a claim by calling 1-975.662.1056 or visiting the [address] [place] [address] website at www.[facility].gov/vaccinecompensation. There is a time limit to file a claim for compensation. How can I learn more? · Ask your doctor. He or she can give you the vaccine package insert or suggest other sources of information. · Call your local or state health department. · Contact the Centers for Disease Control and Prevention (CDC):  ¨ Call 2-776.295.3888 (1-800-CDC-INFO) or  ¨ Visit CDC's website at www.cdc.gov/vaccines  Vaccine Information Statement (Interim)  Tdap Vaccine  (2/24/15)  [id number]-02  Department of Health and Human Services  Centers for Disease Control and Prevention  Many Vaccine Information Statements are available in Korean and other languages. See www.immunize.org/vis.   Muchas hojas de información sobre vacunas están disponibles en español y en otros idiomas. Visite www.immunize.org/vis. Care instructions adapted under license by your healthcare professional. If you have questions about a medical condition or this instruction, always ask your healthcare professional. [address] any warranty or liability for your use of this information. Pneumococcal Polysaccharide Vaccine: What You Need to Know  Why get vaccinated? Vaccination can protect older adults (and some children and younger adults) from pneumococcal disease. Pneumococcal disease is caused by bacteria that can spread from person to person through close contact. It can cause ear infections, and it can also lead to more serious infections of the:  · Lungs (pneumonia),  · Blood (bacteremia), and  · Covering of the brain and spinal cord (meningitis). Meningitis can cause deafness and brain damage, and it can be fatal.  Anyone can get pneumococcal disease, but children under 3years of age, people with certain medical conditions, adults over 72years of age, and cigarette smokers are at the highest risk. About 18,000 older adults die each year from pneumococcal disease in the [place]. Treatment of pneumococcal infections with penicillin and other drugs used to be more effective. But some strains of the disease have become resistant to these drugs. This makes prevention of the disease, through vaccination, even more important. Pneumococcal polysaccharide vaccine (PPSV23)  Pneumococcal polysaccharide vaccine (PPSV23) protects against 23 types of pneumococcal bacteria. It will not prevent all pneumococcal disease. PPSV23 is recommended for:  · All adults 72years of age and older,  · Anyone 2 through 59years of age with certain long-term health problems,  · Anyone 2 through 59years of age with a weakened immune system,  · Adults 23 through 59years of age who smoke cigarettes or have asthma.   Most people need only one dose of PPSV. A second dose is recommended for certain high-risk groups. People 72 and older should get a dose even if they have gotten one or more doses of the vaccine before they turned 65. Your healthcare provider can give you more information about these recommendations. Most healthy adults develop protection within 2 to 3 weeks of getting the shot. Some people should not get this vaccine  · Anyone who has had a life-threatening allergic reaction to PPSV should not get another dose. · Anyone who has a severe allergy to any component of PPSV should not receive it. Tell your provider if you have any severe allergies. · Anyone who is moderately or severely ill when the shot is scheduled may be asked to wait until they recover before getting the vaccine. Someone with a mild illness can usually be vaccinated. · Children less than 3years of age should not receive this vaccine. · There is no evidence that PPSV is harmful to either a pregnant woman or to her fetus. However, as a precaution, women who need the vaccine should be vaccinated before becoming pregnant, if possible. Risks of a vaccine reaction  With any medicine, including vaccines, there is a chance of side effects. These are usually mild and go away on their own, but serious reactions are also possible. About half of people who get PPSV have mild side effects, such as redness or pain where the shot is given, which go away within about two days. Less than 1 out of 100 people develop a fever, muscle aches, or more severe local reactions. Problems that could happen after any vaccine:  · People sometimes faint after a medical procedure, including vaccination. Sitting or lying down for about 15 minutes can help prevent fainting, and injuries caused by a fall. Tell your doctor if you feel dizzy, or have vision changes or ringing in the ears. · Some people get severe pain in the shoulder and have difficulty moving the arm where a shot was given. This happens very rarely. · Any medication can cause a severe allergic reaction. Such reactions from a vaccine are very rare, estimated at about 1 in a million doses, and would happen within a few minutes to a few hours after the vaccination. As with any medicine, there is a very remote chance of a vaccine causing a serious injury or death. The safety of vaccines is always being monitored. For more information, visit: www.cdc.gov/vaccinesafety/  What if there is a serious reaction? What should I look for? Look for anything that concerns you, such as signs of a severe allergic reaction, very high fever, or unusual behavior. Signs of a severe allergic reaction can include hives, swelling of the face and throat, difficulty breathing, a fast heartbeat, dizziness, and weakness. These would usually start a few minutes to a few hours after the vaccination. What should I do? If you think it is a severe allergic reaction or other emergency that can't wait, call 9-1-1 or get to the nearest hospital. Otherwise, call your doctor. Afterward, the reaction should be reported to the Vaccine Adverse Event Reporting System (VAERS). Your doctor might file this report, or you can do it yourself through the VAERS web site at www.vaers. hhs.gov, or by calling 3-741.625.9112. [facility] does not give medical advice. How can I learn more? · Ask your doctor. He or she can give you the vaccine package insert or suggest other sources of information. · Call your local or state health department. · Contact the Centers for Disease Control and Prevention (CDC):  ¨ Call 5-921.635.4772 (1-800-CDC-INFO) or  ¨ Visit CDC's website at www.cdc.gov/vaccines  Vaccine Information Statement  PPSV Vaccine  (04/24/2015)  Department of Health and Human Services  Centers for Disease Control and Prevention  Many Vaccine Information Statements are available in Italian and other languages. See www.immunize.org/vis.   [address] disponibles en español y en muchos otros idiomas. Visite Jabier.si. Care instructions adapted under license by your healthcare professional. If you have questions about a medical condition or this instruction, always ask your healthcare professional. [address] any warranty or liability for your use of this information.

## 2017-06-12 ENCOUNTER — OFFICE VISIT (OUTPATIENT)
Dept: ENDOCRINOLOGY | Age: 52
End: 2017-06-12

## 2017-06-12 VITALS
OXYGEN SATURATION: 100 % | BODY MASS INDEX: 24.2 KG/M2 | RESPIRATION RATE: 16 BRPM | WEIGHT: 136.6 LBS | TEMPERATURE: 98.1 F | SYSTOLIC BLOOD PRESSURE: 112 MMHG | HEIGHT: 63 IN | DIASTOLIC BLOOD PRESSURE: 72 MMHG | HEART RATE: 105 BPM

## 2017-06-12 DIAGNOSIS — E11.65 TYPE 2 DIABETES MELLITUS WITH HYPERGLYCEMIA, WITHOUT LONG-TERM CURRENT USE OF INSULIN (HCC): Primary | ICD-10-CM

## 2017-06-12 DIAGNOSIS — E05.90 HYPERTHYROIDISM: ICD-10-CM

## 2017-06-12 DIAGNOSIS — I10 ESSENTIAL HYPERTENSION: ICD-10-CM

## 2017-06-12 LAB
GLUCOSE POC: 112 MG/DL
HBA1C MFR BLD HPLC: 6.7 %

## 2017-06-12 RX ORDER — METHIMAZOLE 10 MG/1
20 TABLET ORAL DAILY
Qty: 60 TAB | Refills: 5 | Status: SHIPPED | OUTPATIENT
Start: 2017-06-12 | End: 2017-11-10 | Stop reason: SDUPTHER

## 2017-06-12 NOTE — PROGRESS NOTES
[facility]               Abimbola Moffett MD        [address] 78 444 81 66 Fax [phone] ( [facility]) [address] Dejan Marquez [address] M Fax [phone] ( Monday)          Patient Information  Date:2017  Name : Femi Goode 46 y.o.     YOB: 1965         Referred by: Berny Patterson MD         Chief Complaint   Patient presents with    Diabetes    Thyroid Problem       History of Present Illness: Femi Goode is a 46 y.o. female here for follow up of  Type 2 Diabetes Mellitus. She was diagnosed with diabetes in    She has gained weight back   She thought Cecelia Albarran was making her lose weight          She is on Metformin 1000 mg twice daily     She has Apps on her phone to track calories which is helping          Wt Readings from Last 3 Encounters:   17 136 lb 9.6 oz (62 kg)   17 127 lb 12.8 oz (58 kg)   17 130 lb 4.8 oz (59.1 kg)       BP Readings from Last 3 Encounters:   17 112/72   17 116/76   17 144/73           Past Medical History:   Diagnosis Date    Diabetes ([facility] [address]     HTN (hypertension)     Hyperthyroidism      Current Outpatient Prescriptions   Medication Sig    methIMAzole (TAPAZOLE) 10 mg tablet Take 3 Tabs by mouth daily.  metFORMIN (GLUCOPHAGE) 1,000 mg tablet TAKE ONE TABLET BY MOUTH TWICE A DAY WITH MEALS    lisinopril (PRINIVIL, ZESTRIL) 5 mg tablet TAKE ONE TABLET BY MOUTH DAILY    Lancets misc Test blood glucose once daily    glucose blood VI test strips (ONETOUCH VERIO) strip Test blood glucose daily    alogliptin (NESINA) 25 mg tablet Take 1 Tab by mouth daily.  biotin 2,500 mcg tab Take 1 Tab by mouth daily.  ALOE VERA Take 1 Cap by mouth daily. No current facility-administered medications for this visit.       No Known Allergies      Review of Systems:  - Constitutional Symptoms: no fevers, no chills  - Eyes: no blurry vision no double vision  - Cardiovascular: no chest pain ,no palpitations  - Respiratory: no cough no shortness of breath  - Gastrointestinal: no dysphagia no  abdominal pain  - Musculoskeletal: no joint pains no  weakness  - Integumentary: no rashes  -     Physical Examination:   Blood pressure 112/72, pulse (!) 105, temperature 98.1 °F (36.7 °C), temperature source Oral, resp. rate 16, height 5' 3\" (1.6 m), weight 136 lb 9.6 oz (62 kg), SpO2 100 %. Estimated body mass index is 24.2 kg/(m^2) as calculated from the following:    Height as of this encounter: 5' 3\" (1.6 m). -   Weight as of this encounter: 136 lb 9.6 oz (62 kg). - General: pleasant, no distress, good eye contact  - HEENT: no pallor, no periorbital edema, EOMI  - Neck: supple, no thyromegaly  - Cardiovascular: regular, tachycardia  normal S1 and S2  - Respiratory: clear to auscultation bilaterally  - Gastrointestinal: soft, nontender, nondistended,  BS +  - Musculoskeletal:no acanthosis nigricans  - Neurological: ,alert and oriented  - Psychiatric: normal mood and affect  - Skin: color, texture, turgor normal.       Data Reviewed:     [] Glucose records reviewed. [] See glucose records for details (to be scanned).   [] A1C  [] Reviewed labs  Lab Results   Component Value Date/Time    Hemoglobin A1c 6.6 02/15/2017 10:59 AM    Hemoglobin A1c 6.6 10/18/2016 08:28 AM    Hemoglobin A1c 6.9 05/10/2016 08:41 AM    Glucose 117 02/15/2017 10:59 AM    Glucose  06/12/2017 10:33 AM    Microalb/Creat ratio (ug/mg creat.) 10.1 02/15/2017 10:59 AM    LDL,Direct 64 05/10/2016 08:41 AM    LDL, calculated 34 02/15/2017 10:59 AM    Creatinine 0.50 02/15/2017 10:59 AM      Lab Results   Component Value Date/Time    Cholesterol, total 107 02/15/2017 10:59 AM    HDL Cholesterol 55 02/15/2017 10:59 AM    LDL,Direct 64 05/10/2016 08:41 AM    LDL, calculated 34 02/15/2017 10:59 AM    Triglyceride 91 02/15/2017 10:59 AM     Lab Results   Component Value Date/Time ALT (SGPT) 39 02/15/2017 10:59 AM    AST (SGOT) 27 02/15/2017 10:59 AM    Alk. phosphatase 55 02/15/2017 10:59 AM    Bilirubin, total 0.4 02/15/2017 10:59 AM     Lab Results   Component Value Date/Time    GFR est  02/15/2017 10:59 AM    GFR est non- 02/15/2017 10:59 AM    Creatinine 0.50 02/15/2017 10:59 AM    BUN 9 02/15/2017 10:59 AM    Sodium 141 02/15/2017 10:59 AM    Potassium 4.6 02/15/2017 10:59 AM    Chloride 101 02/15/2017 10:59 AM    CO2 23 02/15/2017 10:59 AM      Lab Results   Component Value Date/Time    TSH <0.006 06/05/2017 09:19 AM        Assessment/Plan:     1. Type 2 diabetes mellitus with hyperglycemia, without long-term current use of insulin ([address]    2. Hyperthyroidism        Diabetes:    Lab Results   Component Value Date/Time    Hemoglobin A1c 6.6 02/15/2017 10:59 AM    Hemoglobin A1c (POC) 7.8 12/01/2014 01:15 PM   Metformin  Life style changes, discussed goals of glucose and A1C     Hypertension:  On Lisinopril. Overweight - weight stable  Body mass index is 24.2 kg/(m^2). Vitamin D - dairy intake       Graves disease   Methimazole since4/17    Methimazole 20 mg , Aug 10 mg   Improving       There are no Patient Instructions on file for this visit. Follow-up Disposition: Not on File    Thank you for allowing me to participate in the care of this patient.     Jass Fish MD

## 2017-06-12 NOTE — PROGRESS NOTES
Anjelica Crook is a 46 y.o. female here for   Chief Complaint   Patient presents with    Diabetes    Thyroid Problem       Functional glucose monitor and record keeping system? - yes  Eye exam within last year? - no  Foot exam within last year? - no    1. Have you been to the ER, urgent care clinic since your last visit? Hospitalized since your last visit? - no    2. Have you seen or consulted any other health care providers outside of the [address] [place] [address] since your last visit?   Include any pap smears or colon screening.- no      Lab Results   Component Value Date/Time    Hemoglobin A1c 6.6 02/15/2017 10:59 AM    Hemoglobin A1c (POC) 7.8 12/01/2014 01:15 PM       Wt Readings from Last 3 Encounters:   04/21/17 127 lb 12.8 oz (58 kg)   04/14/17 130 lb 4.8 oz (59.1 kg)   03/07/17 133 lb 12.8 oz (60.7 kg)     Temp Readings from Last 3 Encounters:   04/21/17 98.6 °F (37 °C) (Oral)   04/14/17 98.5 °F (36.9 °C) (Oral)   03/07/17 98.3 °F (36.8 °C) (Oral)     BP Readings from Last 3 Encounters:   04/21/17 116/76   04/14/17 144/73   03/07/17 132/74     Pulse Readings from Last 3 Encounters:   04/21/17 (!) 118   04/14/17 (!) 130   03/07/17 (!) 112

## 2017-06-12 NOTE — MR AVS SNAPSHOT
Visit Information Date & Time Provider Department Dept. Phone Encounter #  
 6/12/2017 10:15 AM Chelsie Mckeon MD [facility] [phone] [id number] Follow-up Instructions Return in about 3 months (around 9/12/2017). Your Appointments 7/18/2017  8:00 AM  
ROUTINE CARE with MD Zhao Borges. Paigeolga Antonia [address] [place] [address]) Appt Note: 3 months [address] [facility] [address]  
[place] [address] Upcoming Health Maintenance Date Due Hepatitis C Screening 1965 FOOT EXAM Q1 9/20/1975 Pneumococcal 19-64 Medium Risk (1 of 1 - PPSV23) 9/20/1984 DTaP/Tdap/Td series (1 - Tdap) 9/20/1986 EYE EXAM RETINAL OR DILATED Q1 1/7/2017 INFLUENZA AGE 9 TO ADULT 8/1/2017 HEMOGLOBIN A1C Q6M 8/15/2017 MICROALBUMIN Q1 2/15/2018 LIPID PANEL Q1 2/15/2018 BREAST CANCER SCRN MAMMOGRAM 11/16/2018 PAP AKA CERVICAL CYTOLOGY 7/19/2019 COLONOSCOPY 8/3/2021 Allergies as of 6/12/2017  Review Complete On: 6/12/2017 By: Chelsie Mckeon MD  
 No Known Allergies Current Immunizations  Never Reviewed No immunizations on file. Not reviewed this visit You Were Diagnosed With   
  
 Codes Comments Type 2 diabetes mellitus with hyperglycemia, without long-term current use of insulin (HCC)    -  Primary ICD-10-CM: E11.65 ICD-9-CM: 250.00, 790.29 Hyperthyroidism     ICD-10-CM: E05.90 ICD-9-CM: 242.90 Vitals BP Pulse Temp Resp Height(growth percentile) Weight(growth percentile) 112/72 (BP 1 Location: Right arm, BP Patient Position: Sitting) (!) 105 98.1 °F (36.7 °C) (Oral) 16 5' 3\" (1.6 m) 136 lb 9.6 oz (62 kg) SpO2 BMI OB Status Smoking Status 100% 24.2 kg/m2 Premenopausal Never Smoker BMI and BSA Data  Body Mass Index Body Surface Area  
 24.2 kg/m 2 1.66 m 2  
  
  
 Preferred Pharmacy Pharmacy Name Phone Anya Worthington [address], [address] [phone] Your Updated Medication List  
  
   
This list is accurate as of: 6/12/17 10:55 AM.  Always use your most recent med list.  
  
  
  
  
 Constanza Mercer Take 1 Cap by mouth daily. alogliptin 25 mg tablet Commonly known as:  Wallis Eans Take 1 Tab by mouth daily. biotin 2,500 mcg Tab Take 1 Tab by mouth daily. glucose blood VI test strips strip Commonly known as:  Sukhjinder Brewer Test blood glucose daily Lancets Misc Test blood glucose once daily  
  
 lisinopril 5 mg tablet Commonly known as:  PRINIVIL, ZESTRIL  
TAKE ONE TABLET BY MOUTH DAILY  
  
 metFORMIN 1,000 mg tablet Commonly known as:  GLUCOPHAGE  
TAKE ONE TABLET BY MOUTH TWICE A DAY WITH MEALS  
  
 methIMAzole 10 mg tablet Commonly known as:  TAPAZOLE Take 3 Tabs by mouth daily. We Performed the Following AMB POC GLUCOSE, QUANTITATIVE, BLOOD [72559 CPT(R)] AMB POC HEMOGLOBIN A1C [98015 CPT(R)] Follow-up Instructions Return in about 3 months (around 9/12/2017). Please provide this summary of care documentation to your next provider. Your primary care clinician is listed as Reed Jesus. If you have any questions after today's visit, please call [phone].

## 2017-07-17 ENCOUNTER — TELEPHONE (OUTPATIENT)
Dept: FAMILY MEDICINE CLINIC | Age: 52
End: 2017-07-17

## 2017-07-17 NOTE — TELEPHONE ENCOUNTER
Patient has cancelled her appointment for 7-; she does not remember why she was supposed to come in for a 3 month f/u visit; if Dr. Narinder Lai wants to see her, she said the nurse can call her back and she will reschedule the appointment; best contact number for the patient is [phone]; thank you

## 2017-09-08 ENCOUNTER — LAB ONLY (OUTPATIENT)
Dept: ENDOCRINOLOGY | Age: 52
End: 2017-09-08

## 2017-09-08 DIAGNOSIS — I10 ESSENTIAL HYPERTENSION: ICD-10-CM

## 2017-09-08 DIAGNOSIS — E11.65 TYPE 2 DIABETES MELLITUS WITH HYPERGLYCEMIA, WITHOUT LONG-TERM CURRENT USE OF INSULIN (HCC): ICD-10-CM

## 2017-09-08 DIAGNOSIS — E05.90 HYPERTHYROIDISM: ICD-10-CM

## 2017-09-09 LAB
T4 FREE SERPL-MCNC: 3.44 NG/DL (ref 0.82–1.77)
TSH SERPL DL<=0.005 MIU/L-ACNC: <0.006 UIU/ML (ref 0.45–4.5)

## 2017-09-09 NOTE — PROGRESS NOTES
Thyroid test - indicating that it is fast again     Is she taking Methimazole consistently - has no follow up appointment with m e

## 2017-09-12 ENCOUNTER — TELEPHONE (OUTPATIENT)
Dept: ENDOCRINOLOGY | Age: 52
End: 2017-09-12

## 2017-09-12 DIAGNOSIS — E05.90 HYPERTHYROIDISM: Primary | ICD-10-CM

## 2017-09-12 NOTE — TELEPHONE ENCOUNTER
Informed pt of result note. Pt stated she is taking methimazole consistently. She went from 3 tabs to 2 tabs to 1 tab daily, as discussed at her last appt. Pt asked for her numbers. Provided numbers. Pt wants to know how she should take her med? Pt also stated she had an appt for this week, but somehow it got canceled. Transferred pt to  to schedule f/u.

## 2017-09-12 NOTE — TELEPHONE ENCOUNTER
----- Message from Kirstin Perry MD sent at 9/9/2017  5:11 PM EDT -----  Thyroid test - indicating that it is fast again     Is she taking Methimazole consistently - has no follow up appointment with m e

## 2017-09-12 NOTE — TELEPHONE ENCOUNTER
Thyroid is fast     Ask her to take 2 tab of Methimazole which is 20 mg     Labs bnv TSH ,FT4 - 2 month FU

## 2017-09-12 NOTE — TELEPHONE ENCOUNTER
Informed pt of Dr. Espinoza Leopoldo note. Pt verbalized understanding with no further questions or concerns at this time. Pt transferred to  to schedule lab appt a week prior to f/u.     Order placed for pt per verbal order with read back from Dr. Zenaida Mc 09/12/17

## 2017-10-18 DIAGNOSIS — I10 ESSENTIAL HYPERTENSION: ICD-10-CM

## 2017-10-18 DIAGNOSIS — E11.65 UNCONTROLLED TYPE 2 DIABETES MELLITUS WITH HYPERGLYCEMIA, WITHOUT LONG-TERM CURRENT USE OF INSULIN (HCC): ICD-10-CM

## 2017-10-18 RX ORDER — LISINOPRIL 5 MG/1
TABLET ORAL
Qty: 90 TAB | Refills: 3 | Status: SHIPPED | OUTPATIENT
Start: 2017-10-18 | End: 2018-11-07 | Stop reason: SDUPTHER

## 2017-10-18 RX ORDER — METFORMIN HYDROCHLORIDE 1000 MG/1
TABLET ORAL
Qty: 180 TAB | Refills: 3 | Status: SHIPPED | OUTPATIENT
Start: 2017-10-18 | End: 2018-12-14 | Stop reason: SDUPTHER

## 2017-11-08 ENCOUNTER — TELEPHONE (OUTPATIENT)
Dept: ENDOCRINOLOGY | Age: 52
End: 2017-11-08

## 2017-11-08 NOTE — TELEPHONE ENCOUNTER
----- Message from Indu Naidu sent at 11/7/2017  4:44 PM EST -----  Regarding: Dr. Sangeeta Lord  Patient, requesting information on her blood work. Best contact number is .

## 2017-11-09 NOTE — TELEPHONE ENCOUNTER
Returned pt's call. Pt just wants to know the values so she wont go into appt blind tomorrow. Provided values to pt. Pt verbalized understanding with no further questions or concerns at this time.

## 2017-11-10 ENCOUNTER — OFFICE VISIT (OUTPATIENT)
Dept: ENDOCRINOLOGY | Age: 52
End: 2017-11-10

## 2017-11-10 VITALS
RESPIRATION RATE: 14 BRPM | HEART RATE: 84 BPM | DIASTOLIC BLOOD PRESSURE: 74 MMHG | SYSTOLIC BLOOD PRESSURE: 132 MMHG | HEIGHT: 63 IN | OXYGEN SATURATION: 100 % | WEIGHT: 149.3 LBS | BODY MASS INDEX: 26.45 KG/M2 | TEMPERATURE: 97.4 F

## 2017-11-10 DIAGNOSIS — E11.65 TYPE 2 DIABETES MELLITUS WITH HYPERGLYCEMIA, WITHOUT LONG-TERM CURRENT USE OF INSULIN (HCC): Primary | ICD-10-CM

## 2017-11-10 DIAGNOSIS — E05.00 GRAVES DISEASE: ICD-10-CM

## 2017-11-10 DIAGNOSIS — I10 ESSENTIAL HYPERTENSION: ICD-10-CM

## 2017-11-10 DIAGNOSIS — E05.90 HYPERTHYROIDISM: Primary | ICD-10-CM

## 2017-11-10 DIAGNOSIS — E55.9 VITAMIN D DEFICIENCY: ICD-10-CM

## 2017-11-10 LAB
GLUCOSE POC: 115 MG/DL
HBA1C MFR BLD HPLC: 6.8 %

## 2017-11-10 RX ORDER — ASPIRIN 81 MG/1
81 TABLET ORAL DAILY
Qty: 30 TAB | Refills: 5 | Status: SHIPPED | OUTPATIENT
Start: 2017-11-10 | End: 2018-05-10 | Stop reason: SDUPTHER

## 2017-11-10 RX ORDER — METHIMAZOLE 5 MG/1
5 TABLET ORAL DAILY
Qty: 30 TAB | Refills: 5 | Status: SHIPPED | OUTPATIENT
Start: 2017-11-10 | End: 2018-03-09 | Stop reason: SDUPTHER

## 2017-11-10 NOTE — PROGRESS NOTES
Mikaela Womack is a 46 y.o. female here for   Chief Complaint   Patient presents with    Diabetes    Thyroid Problem       Functional glucose monitor and record keeping system? -   Eye exam within last year? -   Foot exam within last year? -     1. Have you been to the ER, urgent care clinic since your last visit? Hospitalized since your last visit? -no    2. Have you seen or consulted any other health care providers outside of the [address] [place] [address] since your last visit?   Include any pap smears or colon screening.-no      Lab Results   Component Value Date/Time    Hemoglobin A1c 6.6 02/15/2017 10:59 AM    Hemoglobin A1c (POC) 6.7 06/12/2017 10:33 AM       Wt Readings from Last 3 Encounters:   06/12/17 136 lb 9.6 oz (62 kg)   04/21/17 127 lb 12.8 oz (58 kg)   04/14/17 130 lb 4.8 oz (59.1 kg)     Temp Readings from Last 3 Encounters:   06/12/17 98.1 °F (36.7 °C) (Oral)   04/21/17 98.6 °F (37 °C) (Oral)   04/14/17 98.5 °F (36.9 °C) (Oral)     BP Readings from Last 3 Encounters:   06/12/17 112/72   04/21/17 116/76   04/14/17 144/73     Pulse Readings from Last 3 Encounters:   06/12/17 (!) 105   04/21/17 (!) 118   04/14/17 (!) 130

## 2017-11-10 NOTE — MR AVS SNAPSHOT
Visit Information Date & Time Provider Department Dept. Phone Encounter #  
 11/10/2017  9:00 AM Chris Guardado MD [facility] [phone] [id number] Follow-up Instructions Return in about 4 months (around 3/10/2018). Upcoming Health Maintenance Date Due Hepatitis C Screening 1965 FOOT EXAM Q1 9/20/1975 Pneumococcal 19-64 Medium Risk (1 of 1 - PPSV23) 9/20/1984 DTaP/Tdap/Td series (1 - Tdap) 9/20/1986 Influenza Age 5 to Adult 8/1/2017 HEMOGLOBIN A1C Q6M 12/12/2017 MICROALBUMIN Q1 2/15/2018 LIPID PANEL Q1 2/15/2018 EYE EXAM RETINAL OR DILATED Q1 8/17/2018 BREAST CANCER SCRN MAMMOGRAM 11/16/2018 PAP AKA CERVICAL CYTOLOGY 7/19/2019 COLONOSCOPY 8/3/2021 Allergies as of 11/10/2017  Review Complete On: 11/10/2017 By: Chris Guardado MD  
 No Known Allergies Current Immunizations  Never Reviewed No immunizations on file. Not reviewed this visit You Were Diagnosed With   
  
 Codes Comments Type 2 diabetes mellitus with hyperglycemia, without long-term current use of insulin (HCC)    -  Primary ICD-10-CM: E11.65 ICD-9-CM: 250.00, 790.29 Essential hypertension     ICD-10-CM: I10 
ICD-9-CM: 401.9 Vitamin D deficiency     ICD-10-CM: E55.9 ICD-9-CM: 268.9 Vitals BP Pulse Temp Resp Height(growth percentile) Weight(growth percentile) 132/74 (BP 1 Location: Right arm, BP Patient Position: Sitting) 84 97.4 °F (36.3 °C) (Oral) 14 5' 3\" (1.6 m) 149 lb 4.8 oz (67.7 kg) SpO2 BMI OB Status Smoking Status 100% 26.45 kg/m2 Premenopausal Never Smoker Vitals History BMI and BSA Data Body Mass Index Body Surface Area  
 26.45 kg/m 2 1.73 m 2 [facility] [address], [address] [phone] Your Updated Medication List  
  
   
 This list is accurate as of: 11/10/17  9:45 AM.  Always use your most recent med list.  
  
  
  
  
 Gi Hightoewr Take 1 Cap by mouth daily. biotin 2,500 mcg Tab Take 1 Tab by mouth daily. glucose blood VI test strips strip Commonly known as:  Kayce Ahjudi Test blood glucose daily Lancets Misc Test blood glucose once daily  
  
 lisinopril 5 mg tablet Commonly known as:  PRINIVIL, ZESTRIL  
TAKE ONE TABLET BY MOUTH DAILY  
  
 metFORMIN 1,000 mg tablet Commonly known as:  GLUCOPHAGE  
TAKE ONE TABLET BY MOUTH TWICE A DAY WITH MEALS  
  
 methIMAzole 10 mg tablet Commonly known as:  TAPAZOLE Take 2 Tabs by mouth daily. We Performed the Following AMB POC GLUCOSE, QUANTITATIVE, BLOOD [59589 CPT(R)] AMB POC HEMOGLOBIN A1C [59796 CPT(R)] Follow-up Instructions Return in about 4 months (around 3/10/2018). Please provide this summary of care documentation to your next provider. Your primary care clinician is listed as Carlo Taylor. If you have any questions after today's visit, please call [phone].

## 2017-11-10 NOTE — PROGRESS NOTES
Rosy Lynn MD        Patient Information  Date:11/10/2017  Name : Alexandra Atkinson 46 y.o.     YOB: 1965         Referred by: Vineet Francisco MD         Chief Complaint   Patient presents with    Diabetes    Thyroid Problem       History of Present Illness: Alexandra Atkinson is a 46 y.o. female here for follow up of  Type 2 Diabetes Mellitus. She was diagnosed with diabetes in 2013   She has gained weight back     She is on Metformin 1000 mg twice daily   Off nesina   on MMI     She has Apps on her phone to track calories which is helping          Wt Readings from Last 3 Encounters:   11/10/17 149 lb 4.8 oz (67.7 kg)   06/12/17 136 lb 9.6 oz (62 kg)   04/21/17 127 lb 12.8 oz (58 kg)       BP Readings from Last 3 Encounters:   11/10/17 132/74   06/12/17 112/72   04/21/17 116/76           Past Medical History:   Diagnosis Date    [facility]     HTN (hypertension)     Hyperthyroidism      Current Outpatient Prescriptions   Medication Sig    lisinopril (PRINIVIL, ZESTRIL) 5 mg tablet TAKE ONE TABLET BY MOUTH DAILY    metFORMIN (GLUCOPHAGE) 1,000 mg tablet TAKE ONE TABLET BY MOUTH TWICE A DAY WITH MEALS    methIMAzole (TAPAZOLE) 10 mg tablet Take 2 Tabs by mouth daily. (Patient taking differently: Take 10 mg by mouth daily.)    Lancets misc Test blood glucose once daily    glucose blood VI test strips (ONETOUCH VERIO) strip Test blood glucose daily    alogliptin (NESINA) 25 mg tablet Take 1 Tab by mouth daily.  biotin 2,500 mcg tab Take 1 Tab by mouth daily.  ALOE VERA Take 1 Cap by mouth daily. No current facility-administered medications for this visit.       No Known Allergies      Review of Systems:  - Constitutional Symptoms: no fevers, no chills  - Eyes: no blurry vision no double vision  - Cardiovascular: no chest pain ,no palpitations  - Respiratory: no cough no shortness of breath  - Gastrointestinal: no dysphagia no abdominal pain  - Musculoskeletal: no joint pains no  weakness  - Integumentary: no rashes  -     Physical Examination:   Blood pressure 132/74, pulse 84, temperature 97.4 °F (36.3 °C), temperature source Oral, resp. rate 14, height 5' 3\" (1.6 m), weight 149 lb 4.8 oz (67.7 kg), SpO2 100 %. Estimated body mass index is 26.45 kg/(m^2) as calculated from the following:    Height as of this encounter: 5' 3\" (1.6 m). -   Weight as of this encounter: 149 lb 4.8 oz (67.7 kg). - General: pleasant, no distress, good eye contact  - HEENT: no pallor, no periorbital edema, EOMI  - Neck: supple, no thyromegaly  - Cardiovascular: regular, tachycardia  normal S1 and S2  - Respiratory: clear to auscultation bilaterally  - Gastrointestinal: soft, nontender, nondistended,  BS +  - Musculoskeletal:no acanthosis nigricans  - Neurological: ,alert and oriented  - Psychiatric: normal mood and affect  - Skin: color, texture, turgor normal.       Data Reviewed:     [] Glucose records reviewed. [] See glucose records for details (to be scanned). [] A1C  [] Reviewed labs  Lab Results   Component Value Date/Time    Hemoglobin A1c 6.6 02/15/2017 10:59 AM    Hemoglobin A1c 6.6 10/18/2016 08:28 AM    Hemoglobin A1c 6.9 05/10/2016 08:41 AM    Glucose 117 02/15/2017 10:59 AM    Glucose  11/10/2017 09:22 AM    Microalb/Creat ratio (ug/mg creat.) 10.1 02/15/2017 10:59 AM    LDL,Direct 64 05/10/2016 08:41 AM    LDL, calculated 34 02/15/2017 10:59 AM    Creatinine 0.50 02/15/2017 10:59 AM      Lab Results   Component Value Date/Time    Cholesterol, total 107 02/15/2017 10:59 AM    HDL Cholesterol 55 02/15/2017 10:59 AM    LDL,Direct 64 05/10/2016 08:41 AM    LDL, calculated 34 02/15/2017 10:59 AM    Triglyceride 91 02/15/2017 10:59 AM     Lab Results   Component Value Date/Time    ALT (SGPT) 39 02/15/2017 10:59 AM    AST (SGOT) 27 02/15/2017 10:59 AM    Alk.  phosphatase 55 02/15/2017 10:59 AM    Bilirubin, total 0.4 02/15/2017 10:59 AM Lab Results   Component Value Date/Time    GFR est  02/15/2017 10:59 AM    GFR est non- 02/15/2017 10:59 AM    Creatinine 0.50 02/15/2017 10:59 AM    BUN 9 02/15/2017 10:59 AM    Sodium 141 02/15/2017 10:59 AM    Potassium 4.6 02/15/2017 10:59 AM    Chloride 101 02/15/2017 10:59 AM    CO2 23 02/15/2017 10:59 AM      Lab Results   Component Value Date/Time    TSH 0.021 11/02/2017 08:19 AM        Assessment/Plan:     1. Type 2 diabetes mellitus with hyperglycemia, without long-term current use of insulin ([address]    2. Essential hypertension    3. Vitamin D deficiency        Diabetes:    Lab Results   Component Value Date/Time    Hemoglobin A1c 6.6 02/15/2017 10:59 AM    Hemoglobin A1c (POC) 6.7 06/12/2017 10:33 AM   Metformin  Life style changes, discussed goals of glucose and A1C     Hypertension:  On Lisinopril. Refused statin     Vitamin D - dairy intake       Graves disease   Methimazole since4/17    Methimazole 5 mg   Improving       There are no Patient Instructions on file for this visit. Follow-up Disposition: Not on File    Thank you for allowing me to participate in the care of this patient.     Estela Hurtado MD

## 2017-11-11 PROBLEM — E05.00 GRAVES DISEASE: Status: ACTIVE | Noted: 2017-11-11

## 2018-03-02 ENCOUNTER — TELEPHONE (OUTPATIENT)
Dept: ENDOCRINOLOGY | Age: 53
End: 2018-03-02

## 2018-03-02 DIAGNOSIS — E05.00 GRAVES' DISEASE: Primary | ICD-10-CM

## 2018-03-09 ENCOUNTER — TELEPHONE (OUTPATIENT)
Dept: ENDOCRINOLOGY | Age: 53
End: 2018-03-09

## 2018-03-09 ENCOUNTER — OFFICE VISIT (OUTPATIENT)
Dept: ENDOCRINOLOGY | Age: 53
End: 2018-03-09

## 2018-03-09 VITALS
BODY MASS INDEX: 26.84 KG/M2 | DIASTOLIC BLOOD PRESSURE: 83 MMHG | WEIGHT: 151.5 LBS | RESPIRATION RATE: 16 BRPM | TEMPERATURE: 98 F | HEIGHT: 63 IN | OXYGEN SATURATION: 100 % | SYSTOLIC BLOOD PRESSURE: 131 MMHG | HEART RATE: 89 BPM

## 2018-03-09 DIAGNOSIS — E11.65 TYPE 2 DIABETES MELLITUS WITH HYPERGLYCEMIA, WITHOUT LONG-TERM CURRENT USE OF INSULIN (HCC): Primary | ICD-10-CM

## 2018-03-09 DIAGNOSIS — E05.90 HYPERTHYROIDISM: ICD-10-CM

## 2018-03-09 DIAGNOSIS — E05.00 GRAVES DISEASE: ICD-10-CM

## 2018-03-09 DIAGNOSIS — I10 ESSENTIAL HYPERTENSION: ICD-10-CM

## 2018-03-09 RX ORDER — METHIMAZOLE 5 MG/1
5 TABLET ORAL DAILY
Qty: 30 TAB | Refills: 5 | Status: SHIPPED | OUTPATIENT
Start: 2018-03-09 | End: 2019-09-05 | Stop reason: SDUPTHER

## 2018-03-09 NOTE — TELEPHONE ENCOUNTER
Per Dr. Abdiaziz Cuevas, informed pt of the following \"All Dm pts need to prevent heart attacks\". Pt verbalized understanding with no further questions or concerns at this time.

## 2018-03-09 NOTE — MR AVS SNAPSHOT
[address] [facility] [address] 
[phone] Patient: Haydee Tiwari MRN: [id number] WGW:[id number]/[id number] Visit Information Date & Time Provider Department Dept. Phone Encounter #  
 3/9/2018  9:30 AM Italia Hatfield MD [facility] [phone] [id number] Follow-up Instructions Return in about 5 months (around 8/9/2018). Upcoming Health Maintenance Date Due Hepatitis C Screening 1965 FOOT EXAM Q1 9/20/1975 Pneumococcal 19-64 Medium Risk (1 of 1 - PPSV23) 9/20/1984 DTaP/Tdap/Td series (1 - Tdap) 9/20/1986 Influenza Age 5 to Adult 8/1/2017 EYE EXAM RETINAL OR DILATED Q1 8/17/2018 HEMOGLOBIN A1C Q6M 9/2/2018 BREAST CANCER SCRN MAMMOGRAM 11/16/2018 MICROALBUMIN Q1 3/2/2019 LIPID PANEL Q1 3/2/2019 PAP AKA CERVICAL CYTOLOGY 7/19/2019 COLONOSCOPY 8/3/2021 Allergies as of 3/9/2018  Review Complete On: 3/9/2018 By: Italia Hatfield MD  
 No Known Allergies Current Immunizations  Never Reviewed No immunizations on file. Not reviewed this visit You Were Diagnosed With   
  
 Codes Comments Type 2 diabetes mellitus with hyperglycemia, without long-term current use of insulin (HCC)    -  Primary ICD-10-CM: E11.65 ICD-9-CM: 250.00, 790.29 Graves disease     ICD-10-CM: E05.00 ICD-9-CM: 242.00 Essential hypertension     ICD-10-CM: I10 
ICD-9-CM: 401.9 Vitals BP Pulse Temp Resp Height(growth percentile) Weight(growth percentile) 131/83 (BP 1 Location: Right arm, BP Patient Position: Sitting) 89 98 °F (36.7 °C) (Oral) 16 5' 3\" (1.6 m) 151 lb 8 oz (68.7 kg) SpO2 BMI OB Status Smoking Status 100% 26.84 kg/m2 Premenopausal Never Smoker Vitals History BMI and BSA Data Body Mass Index Body Surface Area  
 26.84 kg/m 2 1.75 m 2 Preferred Pharmacy Pharmacy Name Phone [facility] [address] [facility] [phone] Your Updated Medication List  
  
   
This list is accurate as of 3/9/18 10:12 AM.  Always use your most recent med list.  
  
  
  
  
 Shelagh Setting Take 1 Cap by mouth daily. aspirin delayed-release 81 mg tablet Take 1 Tab by mouth daily. biotin 2,500 mcg Tab Take 1 Tab by mouth daily. glucose blood VI test strips strip Commonly known as:  Goldy Vale Test blood glucose daily Lancets Misc Test blood glucose once daily  
  
 lisinopril 5 mg tablet Commonly known as:  PRINIVIL, ZESTRIL  
TAKE ONE TABLET BY MOUTH DAILY  
  
 metFORMIN 1,000 mg tablet Commonly known as:  GLUCOPHAGE  
TAKE ONE TABLET BY MOUTH TWICE A DAY WITH MEALS  
  
 methIMAzole 5 mg tablet Commonly known as:  TAPAZOLE Take 1 Tab by mouth daily. Follow-up Instructions Return in about 5 months (around 8/9/2018). Please provide this summary of care documentation to your next provider. Your primary care clinician is listed as Jabier Hernandez. If you have any questions after today's visit, please call [phone].

## 2018-03-09 NOTE — PROGRESS NOTES
Clifford Jesus MD        Patient Information  Date:3/9/2018  Name : Aleks Bentley 46 y.o.     YOB: 1965         Referred by: Cristhian Moralez MD         Chief Complaint   Patient presents with    Diabetes    Thyroid Problem       History of Present Illness: Aleks Bentley is a 46 y.o. female here for follow up of  Type 2 Diabetes Mellitus. She was diagnosed with diabetes in 2013   She is gaining weight back  Return from vacation recently    She is on Metformin 1000 mg twice daily   Off nesina  She is taking methimazole consistently  No high-grade fever or sores in the mouth  No chest pain, shortness of breath, palpitations    She has Apps on her phone to track calories which is helping          Wt Readings from Last 3 Encounters:   03/09/18 151 lb 8 oz (68.7 kg)   11/10/17 149 lb 4.8 oz (67.7 kg)   06/12/17 136 lb 9.6 oz (62 kg)       BP Readings from Last 3 Encounters:   03/09/18 131/83   11/10/17 132/74   06/12/17 112/72           Past Medical History:   Diagnosis Date    Diabetes ([facility] [address]     HTN (hypertension)     Hyperthyroidism      Current Outpatient Prescriptions   Medication Sig    methIMAzole (TAPAZOLE) 5 mg tablet Take 1 Tab by mouth daily.  lisinopril (PRINIVIL, ZESTRIL) 5 mg tablet TAKE ONE TABLET BY MOUTH DAILY    metFORMIN (GLUCOPHAGE) 1,000 mg tablet TAKE ONE TABLET BY MOUTH TWICE A DAY WITH MEALS    Lancets misc Test blood glucose once daily    glucose blood VI test strips (ONETOUCH VERIO) strip Test blood glucose daily    aspirin delayed-release 81 mg tablet Take 1 Tab by mouth daily.  biotin 2,500 mcg tab Take 1 Tab by mouth daily.  ALOE VERA Take 1 Cap by mouth daily. No current facility-administered medications for this visit.       No Known Allergies      Review of Systems:  - Constitutional Symptoms: no fevers, no chills  - Eyes: no blurry vision no double vision  - Cardiovascular: no chest pain ,no palpitations  - Respiratory: no cough no shortness of breath  - Gastrointestinal: no dysphagia no  abdominal pain  - Musculoskeletal: no joint pains no  weakness  - Integumentary: no rashes  -     Physical Examination:   Blood pressure 131/83, pulse 89, temperature 98 °F (36.7 °C), temperature source Oral, resp. rate 16, height 5' 3\" (1.6 m), weight 151 lb 8 oz (68.7 kg), SpO2 100 %. Estimated body mass index is 26.84 kg/(m^2) as calculated from the following:    Height as of this encounter: 5' 3\" (1.6 m). -   Weight as of this encounter: 151 lb 8 oz (68.7 kg). - General: pleasant, no distress, good eye contact  - HEENT: no pallor, no periorbital edema, EOMI  - Neck: supple, no thyromegaly  - Cardiovascular: regular, tachycardia  normal S1 and S2  - Respiratory: clear to auscultation bilaterally  - Gastrointestinal: soft, nontender, nondistended,  BS +  - Musculoskeletal:no acanthosis nigricans  - Neurological: ,alert and oriented  - Psychiatric: normal mood and affect  - Skin: color, texture, turgor normal.   Diabetic foot exam: March 2018    Left:     Vibratory sensation normal    Filament test normal sensation with micro filament   Pulse DP: 1+    Deformities: None  Right:    Vibratory sensation normal   Filament test normal sensation with micro filament   Pulse DP: 1+   Deformities: None      Data Reviewed:     [] Glucose records reviewed. [] See glucose records for details (to be scanned).   [] A1C  [] Reviewed labs  Lab Results   Component Value Date/Time    Hemoglobin A1c 7.0 (H) 03/02/2018 08:30 AM    Hemoglobin A1c 6.6 (H) 02/15/2017 10:59 AM    Hemoglobin A1c 6.6 (H) 10/18/2016 08:28 AM    Glucose 111 (H) 03/02/2018 08:30 AM    Glucose  11/10/2017 09:22 AM    Microalb/Creat ratio (ug/mg creat.) 9.9 03/02/2018 08:30 AM    LDL,Direct 64 05/10/2016 08:41 AM    LDL, calculated 75 03/02/2018 08:30 AM    Creatinine 0.71 03/02/2018 08:30 AM      Lab Results   Component Value Date/Time    Cholesterol, total 155 03/02/2018 08:30 AM    HDL Cholesterol 52 03/02/2018 08:30 AM    LDL,Direct 64 05/10/2016 08:41 AM    LDL, calculated 75 03/02/2018 08:30 AM    Triglyceride 138 03/02/2018 08:30 AM     Lab Results   Component Value Date/Time    ALT (SGPT) 22 03/02/2018 08:30 AM    AST (SGOT) 15 03/02/2018 08:30 AM    Alk. phosphatase 75 03/02/2018 08:30 AM    Bilirubin, total 0.2 03/02/2018 08:30 AM     Lab Results   Component Value Date/Time    GFR est  03/02/2018 08:30 AM    GFR est non-AA 98 03/02/2018 08:30 AM    Creatinine 0.71 03/02/2018 08:30 AM    BUN 9 03/02/2018 08:30 AM    Sodium 142 03/02/2018 08:30 AM    Potassium 4.4 03/02/2018 08:30 AM    Chloride 100 03/02/2018 08:30 AM    CO2 26 03/02/2018 08:30 AM      Lab Results   Component Value Date/Time    TSH 0.076 (L) 03/02/2018 08:30 AM        Assessment/Plan:     1. Type 2 diabetes mellitus with hyperglycemia, without long-term current use of insulin ([address]    2. Graves disease    3. Essential hypertension      Graves' disease  Methimazole 10 mg for 1 month then decrease it to 5 mg daily  Follow TFTs    Diabetes:    Lab Results   Component Value Date/Time    Hemoglobin A1c 7.0 (H) 03/02/2018 08:30 AM    Hemoglobin A1c (POC) 6.8 11/10/2017 09:22 AM   Metformin  Life style changes, discussed goals of glucose and A1C     Hypertension:  On Lisinopril. Refused statin     Vitamin D - dairy intake       Graves disease   Methimazole since4/17    Methimazole 5 mg   Improving       There are no Patient Instructions on file for this visit. Follow-up Disposition: Not on File    Thank you for allowing me to participate in the care of this patient.     Junior Lerner MD

## 2018-03-09 NOTE — TELEPHONE ENCOUNTER
Pt was here for a visit today and forgot to ask Dr. Christine Harris why she needed to take the baby aspirin. She had filled the medication, but never started it because she wasn't sure why she needed it.

## 2018-03-09 NOTE — PROGRESS NOTES
Cori Villa is a 46 y.o. female here for   Chief Complaint   Patient presents with    Diabetes    Thyroid Problem       Functional glucose monitor and record keeping system? - yes  Eye exam within last year? - on file  Foot exam within last year? - due    1. Have you been to the ER, urgent care clinic since your last visit? Hospitalized since your last visit? -no    2. Have you seen or consulted any other health care providers outside of the [address] [place] [address] since your last visit?   Include any pap smears or colon screening.-no      Lab Results   Component Value Date/Time    Hemoglobin A1c 7.0 (H) 03/02/2018 08:30 AM    Hemoglobin A1c (POC) 6.8 11/10/2017 09:22 AM       Wt Readings from Last 3 Encounters:   11/10/17 149 lb 4.8 oz (67.7 kg)   06/12/17 136 lb 9.6 oz (62 kg)   04/21/17 127 lb 12.8 oz (58 kg)     Temp Readings from Last 3 Encounters:   11/10/17 97.4 °F (36.3 °C) (Oral)   06/12/17 98.1 °F (36.7 °C) (Oral)   04/21/17 98.6 °F (37 °C) (Oral)     BP Readings from Last 3 Encounters:   11/10/17 132/74   06/12/17 112/72   04/21/17 116/76     Pulse Readings from Last 3 Encounters:   11/10/17 84   06/12/17 (!) 105   04/21/17 (!) 118

## 2018-05-10 ENCOUNTER — OFFICE VISIT (OUTPATIENT)
Dept: FAMILY MEDICINE CLINIC | Age: 53
End: 2018-05-10

## 2018-05-10 VITALS
RESPIRATION RATE: 18 BRPM | HEIGHT: 63 IN | HEART RATE: 82 BPM | OXYGEN SATURATION: 99 % | SYSTOLIC BLOOD PRESSURE: 129 MMHG | DIASTOLIC BLOOD PRESSURE: 88 MMHG | TEMPERATURE: 98.7 F | BODY MASS INDEX: 26.75 KG/M2 | WEIGHT: 151 LBS

## 2018-05-10 DIAGNOSIS — E11.65 TYPE 2 DIABETES MELLITUS WITH HYPERGLYCEMIA, WITHOUT LONG-TERM CURRENT USE OF INSULIN (HCC): Primary | ICD-10-CM

## 2018-05-10 DIAGNOSIS — I10 ESSENTIAL HYPERTENSION: ICD-10-CM

## 2018-05-10 DIAGNOSIS — E05.90 HYPERTHYROIDISM: ICD-10-CM

## 2018-05-10 DIAGNOSIS — Z11.59 NEED FOR HEPATITIS C SCREENING TEST: ICD-10-CM

## 2018-05-10 DIAGNOSIS — E55.9 VITAMIN D DEFICIENCY: ICD-10-CM

## 2018-05-10 RX ORDER — METHIMAZOLE 5 MG/1
5 TABLET ORAL DAILY
Qty: 30 TAB | Refills: 5 | Status: CANCELLED | OUTPATIENT
Start: 2018-05-10

## 2018-05-10 RX ORDER — ASPIRIN 81 MG/1
81 TABLET ORAL DAILY
Qty: 30 TAB | Refills: 5 | Status: SHIPPED | OUTPATIENT
Start: 2018-05-10 | End: 2021-07-14

## 2018-05-10 NOTE — PROGRESS NOTES
1. Have you been to the ER, urgent care clinic since your last visit? Hospitalized since your last visit? No    2. Have you seen or consulted any other health care providers outside of the [facility] since your last visit? Include any pap smears or colon screening.  No     Chief Complaint   Patient presents with    Complete Physical

## 2018-05-10 NOTE — MR AVS SNAPSHOT
[address] [address] 
[phone] Patient: Patrizia Bird MRN: [id number] LTO:[id number]/98/[id number] Visit Information Date & Time Provider Department Dept. Phone Encounter #  
 5/10/2018  8:30 AM Elyssa Urias MD [address] [place] [address] [id number] Your Appointments 8/2/2018  9:30 AM  
LAB with CDE NURSE [facility] [address] [place] [address]) Appt Note: labs [address] [facility] [address] [facility] [address]  
[phone]  
  
   
 [address] [facility] [address]  
  
    
 8/9/2018 11:45 AM  
ROUTINE CARE with Kirstin Perry MD  
[facility] [address] [place] [address]) Appt Note: 5mo fu  
 [address] [facility] [address]  
[phone]  
  
   
 [address] [place] [id number] Upcoming Health Maintenance Date Due Hepatitis C Screening 1965 FOOT EXAM Q1 9/20/1975 Pneumococcal 19-64 Medium Risk (1 of 1 - PPSV23) 9/20/1984 DTaP/Tdap/Td series (1 - Tdap) 9/20/1986 Influenza Age 5 to Adult 8/1/2018 EYE EXAM RETINAL OR DILATED Q1 8/17/2018 HEMOGLOBIN A1C Q6M 9/2/2018 BREAST CANCER SCRN MAMMOGRAM 11/16/2018 MICROALBUMIN Q1 3/2/2019 LIPID PANEL Q1 3/2/2019 PAP AKA CERVICAL CYTOLOGY 7/19/2019 COLONOSCOPY 8/3/2021 Allergies as of 5/10/2018  Review Complete On: 5/10/2018 By: Elyssa Urias MD  
 No Known Allergies Current Immunizations  Never Reviewed No immunizations on file. Not reviewed this visit You Were Diagnosed With   
  
 Codes Comments Type 2 diabetes mellitus with hyperglycemia, without long-term current use of insulin (HCC)    -  Primary ICD-10-CM: E11.65 ICD-9-CM: 250.00, 790.29 Hyperthyroidism     ICD-10-CM: E05.90 ICD-9-CM: 242.90 Essential hypertension     ICD-10-CM: I10 
ICD-9-CM: 401.9 Vitamin D deficiency     ICD-10-CM: E55.9 ICD-9-CM: 268.9 Need for hepatitis C screening test     ICD-10-CM: Z11.59 
ICD-9-CM: V73.89 Vitals BP Pulse Temp Resp Height(growth percentile) Weight(growth percentile) 129/88 82 98.7 °F (37.1 °C) (Oral) 18 5' 3\" (1.6 m) 151 lb (68.5 kg) SpO2 BMI OB Status Smoking Status 99% 26.75 kg/m2 Premenopausal Never Smoker Vitals History BMI and BSA Data Body Mass Index Body Surface Area  
 26.75 kg/m 2 1.74 m 2 Preferred Pharmacy Pharmacy Name Phone [address] [phone] Your Updated Medication List  
  
   
This list is accurate as of 5/10/18  9:24 AM.  Always use your most recent med list.  
  
  
  
  
 Wendy Grates Take 1 Cap by mouth daily. aspirin delayed-release 81 mg tablet Take 1 Tab by mouth daily. biotin 2,500 mcg Tab Take 1 Tab by mouth daily. glucose blood VI test strips strip Commonly known as:  Skyla Hands Test blood glucose daily Lancets Misc Test blood glucose once daily  
  
 lisinopril 5 mg tablet Commonly known as:  PRINIVIL, ZESTRIL  
TAKE ONE TABLET BY MOUTH DAILY  
  
 metFORMIN 1,000 mg tablet Commonly known as:  GLUCOPHAGE  
TAKE ONE TABLET BY MOUTH TWICE A DAY WITH MEALS  
  
 methIMAzole 5 mg tablet Commonly known as:  TAPAZOLE Take 1 Tab by mouth daily. Prescriptions Sent to Pharmacy Refills  
 aspirin delayed-release 81 mg tablet 5 Sig: Take 1 Tab by mouth daily. Class: Normal  
 Pharmacy: [address] Ph #: [phone] Route: Oral  
  
We Performed the Following HEPATITIS C AB [77448 CPT(R)] VITAMIN D, 1, 25 DIHYDROXY [02025 CPT(R)] Introducing \[facility]\"" & HEALTH SERVICES! Dear Gabi Childress: Thank you for requesting a [employer] account.   Our records indicate that you have previously registered for a [employer] account but its currently inactive. Please call our [employer] support line at [phone]. Additional Information If you have questions, please visit the Frequently Asked Questions section of the [employer] website at [email or web address] Remember, [employer] is NOT to be used for urgent needs. For medical emergencies, dial 911. Now available from your iPhone and Android! Please provide this summary of care documentation to your next provider. Your primary care clinician is listed as Felicita Campos. If you have any questions after today's visit, please call [phone].

## 2018-05-10 NOTE — PROGRESS NOTES
Chief Complaint   Patient presents with    Complete Physical     she is a 46y.o. year old female who presents for evalution. She was last here a year ago  Her heart rate was rapid and I sent her back to endocrine to check for the thyroid as a source    DM  Her gucose has been in the 130s a lot lately  She has been traveling a lot so she is eating out        Reviewed PmHx, RxHx, FmHx, SocHx, AllgHx and updated and dated in the chart.     Aspirin yes ____   No____ N/A____    Patient Active Problem List    Diagnosis    Graves disease    Type 2 diabetes mellitus with hyperglycemia, without long-term current use of insulin ([facility] [address]    BMI 28.0-28.9,adult    Overweight    Type II diabetes mellitus, uncontrolled ([facility] [address]    Essential hypertension    Vitamin D deficiency    Overweight(278.02)    Type II or unspecified type diabetes mellitus without mention of complication, uncontrolled       Nurse notes were reviewed and copied and are correct  Review of Systems - negative except as listed above in the HPI    Objective:     Vitals:    05/10/18 0837   BP: 129/88   Pulse: 82   Resp: 18   Temp: 98.7 °F (37.1 °C)   TempSrc: Oral   SpO2: 99%   Weight: 151 lb (68.5 kg)   Height: 5' 3\" (1.6 m)       Physical Examination: General appearance - alert, well appearing, and in no distress  Mental status - alert, oriented to person, place, and time  Eyes - pupils equal and reactive, extraocular eye movements intact  Ears - bilateral TM's and external ear canals normal  Mouth - mucous membranes moist, pharynx normal without lesions,  malampatti 4  Neck - supple, no significant adenopathy  Lymphatics - no palpable lymphadenopathy, no hepatosplenomegaly  Chest - clear to auscultation, no wheezes, rales or rhonchi, symmetric air entry  Heart - normal rate, regular rhythm, normal S1, S2, no murmurs, rubs, clicks or gallops  Abdomen - soft, nontender, nondistended, no masses or organomegaly  Musculoskeletal - no joint tenderness, deformity or swelling  Extremities - peripheral pulses normal, no pedal edema, no clubbing or cyanosis  Skin - normal coloration and turgor, no rashes, no suspicious skin lesions noted      Assessment/ Plan:   Diagnoses and all orders for this visit:    1. Type 2 diabetes mellitus with hyperglycemia, without long-term current use of insulin (HCC)  Recent a1c was above 7. I counseled her on the importance of exercise 150 min a week at least. Also avoid drinks other than water    2. Hyperthyroidism  -     aspirin delayed-release 81 mg tablet; Take 1 Tab by mouth daily. 3. Essential hypertension  BP under good control  4. Vitamin D deficiency  Check level today     Follow-up Disposition: Not on File    ICD-10-CM ICD-9-CM    1. Type 2 diabetes mellitus with hyperglycemia, without long-term current use of insulin (HCC) E11.65 250.00      790.29    2. Hyperthyroidism E05.90 242.90 aspirin delayed-release 81 mg tablet   3. Essential hypertension I10 401.9    4. Vitamin D deficiency E55.9 268.9 VITAMIN D, 1, 25 DIHYDROXY   5. Need for hepatitis C screening test Z11.59 V73.89 HEPATITIS C AB       I have discussed the diagnosis with the patient and the intended plan as seen in the above orders. The patient has received an after-visit summary and questions were answered concerning future plans. Medication Side Effects and Warnings were discussed with patient: yes  Patient Labs were reviewed and or requested: yes  Patient Past Records were reviewed and or requested: yes        There are no Patient Instructions on file for this visit.     The patient verbalizes understanding and agrees with the plan of care        Patient has the advanced directives booklet to review

## 2018-05-11 LAB
1,25(OH)2D3 SERPL-MCNC: 75 PG/ML (ref 19.9–79.3)
HCV AB S/CO SERPL IA: <0.1 S/CO RATIO (ref 0–0.9)

## 2018-08-06 ENCOUNTER — TELEPHONE (OUTPATIENT)
Dept: FAMILY MEDICINE CLINIC | Age: 53
End: 2018-08-06

## 2018-08-06 NOTE — TELEPHONE ENCOUNTER
Spoke with pt and provided with contact information for opthalmology Dr. Joleen Ashley. Pt stated she will contact them to schedule an OV.

## 2018-08-06 NOTE — TELEPHONE ENCOUNTER
Please call the PT.  Dr. Parveen Longoria told her to go to an Optometrist and she can't find the info

## 2018-11-07 DIAGNOSIS — I10 ESSENTIAL HYPERTENSION: ICD-10-CM

## 2018-11-07 DIAGNOSIS — E11.65 UNCONTROLLED TYPE 2 DIABETES MELLITUS WITH HYPERGLYCEMIA, WITHOUT LONG-TERM CURRENT USE OF INSULIN (HCC): ICD-10-CM

## 2018-11-07 RX ORDER — LISINOPRIL 5 MG/1
TABLET ORAL
Qty: 90 TAB | Refills: 2 | Status: SHIPPED | OUTPATIENT
Start: 2018-11-07 | End: 2019-08-17 | Stop reason: SDUPTHER

## 2018-11-07 NOTE — TELEPHONE ENCOUNTER
----- Message from Jen Ambrocio sent at 11/7/2018  1:42 PM EST -----  Regarding: Dr. Radha Salomon  The patient has been completely out of Rx Lisinopril for two days and is requesting that a refill is called into the pharmacy along with a refill for Rx Methimazole.   ([employer] on file) [phone]

## 2018-12-03 DIAGNOSIS — E05.90 HYPERTHYROIDISM: ICD-10-CM

## 2018-12-03 RX ORDER — METHIMAZOLE 5 MG/1
TABLET ORAL
Qty: 30 TAB | Refills: 4 | Status: SHIPPED | OUTPATIENT
Start: 2018-12-03 | End: 2019-02-06 | Stop reason: SDUPTHER

## 2018-12-14 DIAGNOSIS — E11.65 UNCONTROLLED TYPE 2 DIABETES MELLITUS WITH HYPERGLYCEMIA, WITHOUT LONG-TERM CURRENT USE OF INSULIN (HCC): ICD-10-CM

## 2018-12-14 DIAGNOSIS — I10 ESSENTIAL HYPERTENSION: ICD-10-CM

## 2018-12-14 RX ORDER — METFORMIN HYDROCHLORIDE 1000 MG/1
TABLET ORAL
Qty: 180 TAB | Refills: 2 | Status: SHIPPED | OUTPATIENT
Start: 2018-12-14 | End: 2019-10-04 | Stop reason: SDUPTHER

## 2019-01-30 ENCOUNTER — TELEPHONE (OUTPATIENT)
Dept: ENDOCRINOLOGY | Age: 54
End: 2019-01-30

## 2019-01-30 DIAGNOSIS — E05.00 GRAVES DISEASE: ICD-10-CM

## 2019-01-30 DIAGNOSIS — E11.65 UNCONTROLLED TYPE 2 DIABETES MELLITUS WITH HYPERGLYCEMIA (HCC): Primary | ICD-10-CM

## 2019-01-31 LAB
ALBUMIN SERPL-MCNC: 4.6 G/DL (ref 3.5–5.5)
ALBUMIN/CREAT UR: 8.1 MG/G CREAT (ref 0–30)
ALBUMIN/GLOB SERPL: 1.6 {RATIO} (ref 1.2–2.2)
ALP SERPL-CCNC: 70 IU/L (ref 39–117)
ALT SERPL-CCNC: 58 IU/L (ref 0–32)
AST SERPL-CCNC: 40 IU/L (ref 0–40)
BILIRUB SERPL-MCNC: 0.5 MG/DL (ref 0–1.2)
BUN SERPL-MCNC: 9 MG/DL (ref 6–24)
BUN/CREAT SERPL: 13 (ref 9–23)
CALCIUM SERPL-MCNC: 9.7 MG/DL (ref 8.7–10.2)
CHLORIDE SERPL-SCNC: 101 MMOL/L (ref 96–106)
CHOLEST SERPL-MCNC: 162 MG/DL (ref 100–199)
CO2 SERPL-SCNC: 25 MMOL/L (ref 20–29)
CREAT SERPL-MCNC: 0.71 MG/DL (ref 0.57–1)
CREAT UR-MCNC: 189.8 MG/DL
EST. AVERAGE GLUCOSE BLD GHB EST-MCNC: 148 MG/DL
GLOBULIN SER CALC-MCNC: 2.8 G/DL (ref 1.5–4.5)
GLUCOSE SERPL-MCNC: 100 MG/DL (ref 65–99)
HBA1C MFR BLD: 6.8 % (ref 4.8–5.6)
HDLC SERPL-MCNC: 62 MG/DL
INTERPRETATION, 910389: NORMAL
LDLC SERPL CALC-MCNC: 82 MG/DL (ref 0–99)
Lab: NORMAL
MICROALBUMIN UR-MCNC: 15.4 UG/ML
POTASSIUM SERPL-SCNC: 4 MMOL/L (ref 3.5–5.2)
PROT SERPL-MCNC: 7.4 G/DL (ref 6–8.5)
SODIUM SERPL-SCNC: 140 MMOL/L (ref 134–144)
T4 FREE SERPL-MCNC: 1.14 NG/DL (ref 0.82–1.77)
TRIGL SERPL-MCNC: 90 MG/DL (ref 0–149)
TSH SERPL DL<=0.005 MIU/L-ACNC: 1.75 UIU/ML (ref 0.45–4.5)
VLDLC SERPL CALC-MCNC: 18 MG/DL (ref 5–40)

## 2019-02-06 ENCOUNTER — OFFICE VISIT (OUTPATIENT)
Dept: ENDOCRINOLOGY | Age: 54
End: 2019-02-06

## 2019-02-06 VITALS
BODY MASS INDEX: 27.71 KG/M2 | WEIGHT: 156.4 LBS | RESPIRATION RATE: 16 BRPM | HEIGHT: 63 IN | SYSTOLIC BLOOD PRESSURE: 137 MMHG | DIASTOLIC BLOOD PRESSURE: 77 MMHG | HEART RATE: 88 BPM | TEMPERATURE: 97.2 F | OXYGEN SATURATION: 97 %

## 2019-02-06 DIAGNOSIS — I10 ESSENTIAL HYPERTENSION: ICD-10-CM

## 2019-02-06 DIAGNOSIS — E55.9 VITAMIN D DEFICIENCY: ICD-10-CM

## 2019-02-06 DIAGNOSIS — E05.00 GRAVES DISEASE: ICD-10-CM

## 2019-02-06 DIAGNOSIS — E11.65 TYPE 2 DIABETES MELLITUS WITH HYPERGLYCEMIA, WITHOUT LONG-TERM CURRENT USE OF INSULIN (HCC): Primary | ICD-10-CM

## 2019-02-06 NOTE — PROGRESS NOTES
Edd Cameron MD        Patient Information  Date:2/6/2019  Name : Gina Lares 48 y.o.     YOB: 1965         Referred by: Martha Kerr MD         Chief Complaint   Patient presents with    Diabetes    Thyroid Problem       History of Present Illness: Gina Lares is a 48 y.o. female here for follow up of  Type 2 Diabetes Mellitus. She was diagnosed with diabetes in 2013   Taking the medications consistently  She is on Metformin 1000 mg twice daily   Off nesina  She is taking methimazole consistently  No high-grade fever or sores in the mouth  No chest pain, shortness of breath, palpitations    She has Apps on her phone to track calories which is helping          Wt Readings from Last 3 Encounters:   02/06/19 156 lb 6.4 oz (70.9 kg)   05/10/18 151 lb (68.5 kg)   03/09/18 151 lb 8 oz (68.7 kg)       BP Readings from Last 3 Encounters:   02/06/19 137/77   05/10/18 129/88   03/09/18 131/83           Past Medical History:   Diagnosis Date    Diabetes ([facility] [address]     HTN (hypertension)     Hyperthyroidism      Current Outpatient Medications   Medication Sig    metFORMIN (GLUCOPHAGE) 1,000 mg tablet TAKE ONE TABLET BY MOUTH TWICE A DAY WITH MEALS    lisinopril (PRINIVIL, ZESTRIL) 5 mg tablet TAKE ONE TABLET BY MOUTH DAILY    methIMAzole (TAPAZOLE) 5 mg tablet Take 1 Tab by mouth daily.  Lancets misc Test blood glucose once daily    glucose blood VI test strips (ONETOUCH VERIO) strip Test blood glucose daily    aspirin delayed-release 81 mg tablet Take 1 Tab by mouth daily.  biotin 2,500 mcg tab Take 1 Tab by mouth daily.  ALOE VERA Take 1 Cap by mouth daily. No current facility-administered medications for this visit.       No Known Allergies      Review of Systems:  - Constitutional Symptoms: no fevers, no chills  - Eyes: no blurry vision no double vision  - Cardiovascular: no chest pain ,no palpitations  - Respiratory: no cough no shortness of breath  - Gastrointestinal: no dysphagia no  abdominal pain  - Musculoskeletal: no joint pains no  weakness  - Integumentary: no rashes  -     Physical Examination:   Blood pressure 137/77, pulse 88, temperature 97.2 °F (36.2 °C), temperature source Oral, resp. rate 16, height 5' 3\" (1.6 m), weight 156 lb 6.4 oz (70.9 kg), SpO2 97 %. Estimated body mass index is 27.71 kg/m² as calculated from the following:    Height as of this encounter: 5' 3\" (1.6 m). -   Weight as of this encounter: 156 lb 6.4 oz (70.9 kg).   - General: pleasant, no distress, good eye contact  - HEENT: no pallor, no periorbital edema, EOMI  - Neck: supple, no thyromegaly  - Cardiovascular: regular, tachycardia  normal S1 and S2  - Respiratory: clear to auscultation bilaterally  - Gastrointestinal: soft, nontender, nondistended,  BS +  - Musculoskeletal:no acanthosis nigricans  - Neurological: ,alert and oriented  - Psychiatric: normal mood and affect  - Skin: color, texture, turgor normal.   Diabetic foot exam: March 2018    Left:     Vibratory sensation normal    Filament test normal sensation with micro filament   Pulse DP: 1+    Deformities: None  Right:    Vibratory sensation normal   Filament test normal sensation with micro filament   Pulse DP: 1+   Deformities: None      Data Reviewed:       Lab Results   Component Value Date/Time    Hemoglobin A1c 6.8 (H) 01/30/2019 10:32 AM    Hemoglobin A1c 7.2 (H) 08/02/2018 09:36 AM    Hemoglobin A1c 7.0 (H) 03/02/2018 08:30 AM    Glucose 100 (H) 01/30/2019 10:32 AM    Glucose  11/10/2017 09:22 AM    Microalb/Creat ratio (ug/mg creat.) 8.1 01/30/2019 10:32 AM    LDL,Direct 64 05/10/2016 08:41 AM    LDL, calculated 82 01/30/2019 10:32 AM    Creatinine 0.71 01/30/2019 10:32 AM      Lab Results   Component Value Date/Time    Cholesterol, total 162 01/30/2019 10:32 AM    HDL Cholesterol 62 01/30/2019 10:32 AM    LDL,Direct 64 05/10/2016 08:41 AM    LDL, calculated 82 01/30/2019 10:32 AM    Triglyceride 90 01/30/2019 10:32 AM     Lab Results   Component Value Date/Time    ALT (SGPT) 58 (H) 01/30/2019 10:32 AM    AST (SGOT) 40 01/30/2019 10:32 AM    Alk. phosphatase 70 01/30/2019 10:32 AM    Bilirubin, total 0.5 01/30/2019 10:32 AM     Lab Results   Component Value Date/Time    GFR est  01/30/2019 10:32 AM    GFR est non-AA 98 01/30/2019 10:32 AM    Creatinine 0.71 01/30/2019 10:32 AM    BUN 9 01/30/2019 10:32 AM    Sodium 140 01/30/2019 10:32 AM    Potassium 4.0 01/30/2019 10:32 AM    Chloride 101 01/30/2019 10:32 AM    CO2 25 01/30/2019 10:32 AM      Lab Results   Component Value Date/Time    TSH 1.750 01/30/2019 10:32 AM        Assessment/Plan:     1. Type 2 diabetes mellitus with hyperglycemia, without long-term current use of insulin ([address]    2. Essential hypertension    3. Graves disease    4. Vitamin D deficiency      Graves' disease      Diabetes:    Lab Results   Component Value Date/Time    Hemoglobin A1c 6.8 (H) 01/30/2019 10:32 AM    Hemoglobin A1c (POC) 6.8 11/10/2017 09:22 AM   Metformin      Hypertension:  On Lisinopril. Refused statin     Vitamin D - dairy intake       Graves disease   Methimazole since4/17    Methimazole 2.5 mg , stop March 2019   Improving       There are no Patient Instructions on file for this visit. Follow-up Disposition: Not on File    Thank you for allowing me to participate in the care of this patient.     Isaura Ball MD      Patient verbalized understanding

## 2019-08-05 DIAGNOSIS — E05.90 HYPERTHYROIDISM: ICD-10-CM

## 2019-08-05 RX ORDER — METHIMAZOLE 5 MG/1
TABLET ORAL
Qty: 30 TAB | Refills: 3 | Status: SHIPPED | OUTPATIENT
Start: 2019-08-05 | End: 2020-01-24 | Stop reason: SDUPTHER

## 2019-08-17 DIAGNOSIS — E11.65 UNCONTROLLED TYPE 2 DIABETES MELLITUS WITH HYPERGLYCEMIA, WITHOUT LONG-TERM CURRENT USE OF INSULIN (HCC): ICD-10-CM

## 2019-08-17 DIAGNOSIS — I10 ESSENTIAL HYPERTENSION: ICD-10-CM

## 2019-08-17 RX ORDER — LISINOPRIL 5 MG/1
TABLET ORAL
Qty: 90 TAB | Refills: 1 | Status: SHIPPED | OUTPATIENT
Start: 2019-08-17 | End: 2020-01-11

## 2019-08-29 DIAGNOSIS — E11.65 TYPE 2 DIABETES MELLITUS WITH HYPERGLYCEMIA, WITHOUT LONG-TERM CURRENT USE OF INSULIN (HCC): ICD-10-CM

## 2019-08-29 DIAGNOSIS — E55.9 VITAMIN D DEFICIENCY: ICD-10-CM

## 2019-08-29 DIAGNOSIS — E05.00 GRAVES DISEASE: ICD-10-CM

## 2019-08-29 DIAGNOSIS — I10 ESSENTIAL HYPERTENSION: ICD-10-CM

## 2019-08-30 LAB
ALBUMIN SERPL-MCNC: 4.6 G/DL (ref 3.5–5.5)
ALBUMIN/GLOB SERPL: 1.6 {RATIO} (ref 1.2–2.2)
ALP SERPL-CCNC: 78 IU/L (ref 39–117)
ALT SERPL-CCNC: 67 IU/L (ref 0–32)
AST SERPL-CCNC: 38 IU/L (ref 0–40)
BILIRUB SERPL-MCNC: 0.5 MG/DL (ref 0–1.2)
BUN SERPL-MCNC: 8 MG/DL (ref 6–24)
BUN/CREAT SERPL: 10 (ref 9–23)
CALCIUM SERPL-MCNC: 9.8 MG/DL (ref 8.7–10.2)
CHLORIDE SERPL-SCNC: 101 MMOL/L (ref 96–106)
CO2 SERPL-SCNC: 23 MMOL/L (ref 20–29)
CREAT SERPL-MCNC: 0.79 MG/DL (ref 0.57–1)
EST. AVERAGE GLUCOSE BLD GHB EST-MCNC: 163 MG/DL
GLOBULIN SER CALC-MCNC: 2.8 G/DL (ref 1.5–4.5)
GLUCOSE SERPL-MCNC: 122 MG/DL (ref 65–99)
HBA1C MFR BLD: 7.3 % (ref 4.8–5.6)
POTASSIUM SERPL-SCNC: 4.4 MMOL/L (ref 3.5–5.2)
PROT SERPL-MCNC: 7.4 G/DL (ref 6–8.5)
SODIUM SERPL-SCNC: 140 MMOL/L (ref 134–144)
T4 FREE SERPL-MCNC: 1.45 NG/DL (ref 0.82–1.77)
TSH SERPL DL<=0.005 MIU/L-ACNC: 0.15 UIU/ML (ref 0.45–4.5)

## 2019-09-04 NOTE — PROGRESS NOTES
Shira Walker is a 48 y.o. female here for   Chief Complaint   Patient presents with    Diabetes    Diabetic Foot Exam    Thyroid Problem       Functional glucose monitor and record keeping system? -yes   Eye exam within last year? - on file  Foot exam within last year? - due    1. Have you been to the ER, urgent care clinic since your last visit? Hospitalized since your last visit? -no    2. Have you seen or consulted any other health care providers outside of the [address] [place] [address] since your last visit?   Include any pap smears or colon screening.-no

## 2019-09-05 ENCOUNTER — OFFICE VISIT (OUTPATIENT)
Dept: ENDOCRINOLOGY | Age: 54
End: 2019-09-05

## 2019-09-05 VITALS
SYSTOLIC BLOOD PRESSURE: 138 MMHG | HEART RATE: 90 BPM | WEIGHT: 156 LBS | HEIGHT: 63 IN | RESPIRATION RATE: 14 BRPM | BODY MASS INDEX: 27.64 KG/M2 | TEMPERATURE: 97.7 F | OXYGEN SATURATION: 100 % | DIASTOLIC BLOOD PRESSURE: 86 MMHG

## 2019-09-05 DIAGNOSIS — E55.9 VITAMIN D DEFICIENCY: ICD-10-CM

## 2019-09-05 DIAGNOSIS — E11.65 TYPE 2 DIABETES MELLITUS WITH HYPERGLYCEMIA, WITHOUT LONG-TERM CURRENT USE OF INSULIN (HCC): Primary | ICD-10-CM

## 2019-09-05 DIAGNOSIS — R74.01 TRANSAMINITIS: ICD-10-CM

## 2019-09-05 DIAGNOSIS — E05.00 GRAVES DISEASE: ICD-10-CM

## 2019-09-05 DIAGNOSIS — I10 ESSENTIAL HYPERTENSION: ICD-10-CM

## 2019-09-05 NOTE — PROGRESS NOTES
José Costello MD        Patient Information  Date:9/5/2019  Name : Evans Hammonds 48 y.o.     YOB: 1965         Referred by: Remigio Young MD         Chief Complaint   Patient presents with    Diabetes    Diabetic Foot Exam    Thyroid Problem       History of Present Illness: Evans Hammonds is a 48 y.o. female here for follow up of  Type 2 Diabetes Mellitus. She was diagnosed with diabetes in 2013   Taking the medications consistently  She is on Metformin 1000 mg twice daily   Off nesina  She is taking methimazole consistently, 2.5 mg    No high-grade fever or sores in the mouth  No chest pain, shortness of breath, palpitations    She has Apps on her phone to track calories which is helping          Wt Readings from Last 3 Encounters:   09/05/19 156 lb (70.8 kg)   02/06/19 156 lb 6.4 oz (70.9 kg)   05/10/18 151 lb (68.5 kg)       BP Readings from Last 3 Encounters:   09/05/19 138/86   02/06/19 137/77   05/10/18 129/88           Past Medical History:   Diagnosis Date    Diabetes ([facility] [address]     HTN (hypertension)     Hyperthyroidism      Current Outpatient Medications   Medication Sig    lisinopril (PRINIVIL, ZESTRIL) 5 mg tablet TAKE ONE TABLET BY MOUTH DAILY    methIMAzole (TAPAZOLE) 5 mg tablet TAKE ONE TABLET BY MOUTH DAILY (Patient taking differently: Take 2.5 mg by mouth daily.)    metFORMIN (GLUCOPHAGE) 1,000 mg tablet TAKE ONE TABLET BY MOUTH TWICE A DAY WITH MEALS    Lancets misc Test blood glucose once daily    glucose blood VI test strips (ONETOUCH VERIO) strip Test blood glucose daily    aspirin delayed-release 81 mg tablet Take 1 Tab by mouth daily.  biotin 2,500 mcg tab Take 1 Tab by mouth daily.  ALOE VERA Take 1 Cap by mouth daily. No current facility-administered medications for this visit.       No Known Allergies      Review of Systems:  - Constitutional Symptoms: no fevers, no chills  - Eyes: no blurry vision no double vision  - Cardiovascular: no chest pain ,no palpitations  - Respiratory: no cough no shortness of breath  - Gastrointestinal: no dysphagia no  abdominal pain  - Musculoskeletal: no joint pains no  weakness  - Integumentary: no rashes  -     Physical Examination:   Blood pressure 138/86, pulse 90, temperature 97.7 °F (36.5 °C), temperature source Oral, resp. rate 14, height 5' 3\" (1.6 m), weight 156 lb (70.8 kg), SpO2 100 %. Estimated body mass index is 27.63 kg/m² as calculated from the following:    Height as of this encounter: 5' 3\" (1.6 m). -   Weight as of this encounter: 156 lb (70.8 kg).   - General: pleasant, no distress, good eye contact  - HEENT: no pallor, no periorbital edema, EOMI  - Neck: supple, no thyromegaly  - Cardiovascular: regular, tachycardia  normal S1 and S2  - Respiratory: clear to auscultation bilaterally  - Gastrointestinal: soft, nontender, nondistended,  BS +  - Musculoskeletal:no acanthosis nigricans  - Neurological: ,alert and oriented  - Psychiatric: normal mood and affect  - Skin: color, texture, turgor normal.   Diabetic foot exam: September 2019     Left:     Vibratory sensation normal    Filament test normal sensation with micro filament   Pulse DP: 1+    Deformities: None  Right:    Vibratory sensation normal   Filament test normal sensation with micro filament   Pulse DP: 1+   Deformities: None      Data Reviewed:       Lab Results   Component Value Date/Time    Hemoglobin A1c 7.3 (H) 08/29/2019 10:06 AM    Hemoglobin A1c 6.8 (H) 01/30/2019 10:32 AM    Hemoglobin A1c 7.2 (H) 08/02/2018 09:36 AM    Glucose 122 (H) 08/29/2019 10:06 AM    Glucose  11/10/2017 09:22 AM    Microalb/Creat ratio (ug/mg creat.) 8.1 01/30/2019 10:32 AM    LDL,Direct 64 05/10/2016 08:41 AM    LDL, calculated 82 01/30/2019 10:32 AM    Creatinine 0.79 08/29/2019 10:06 AM      Lab Results   Component Value Date/Time    Cholesterol, total 162 01/30/2019 10:32 AM    HDL Cholesterol 62 01/30/2019 10:32 AM    LDL,Direct 64 05/10/2016 08:41 AM    LDL, calculated 82 01/30/2019 10:32 AM    Triglyceride 90 01/30/2019 10:32 AM     Lab Results   Component Value Date/Time    ALT (SGPT) 67 (H) 08/29/2019 10:06 AM    AST (SGOT) 38 08/29/2019 10:06 AM    Alk. phosphatase 78 08/29/2019 10:06 AM    Bilirubin, total 0.5 08/29/2019 10:06 AM     Lab Results   Component Value Date/Time    GFR est AA 99 08/29/2019 10:06 AM    GFR est non-AA 86 08/29/2019 10:06 AM    Creatinine 0.79 08/29/2019 10:06 AM    BUN 8 08/29/2019 10:06 AM    Sodium 140 08/29/2019 10:06 AM    Potassium 4.4 08/29/2019 10:06 AM    Chloride 101 08/29/2019 10:06 AM    CO2 23 08/29/2019 10:06 AM      Lab Results   Component Value Date/Time    TSH 0.153 (L) 08/29/2019 10:06 AM        Assessment/Plan:     1. Type 2 diabetes mellitus with hyperglycemia, without long-term current use of insulin ([address]    2. Graves disease    3. Essential hypertension    4. Vitamin D deficiency      Graves' disease      Diabetes:    Lab Results   Component Value Date/Time    Hemoglobin A1c 7.3 (H) 08/29/2019 10:06 AM    Hemoglobin A1c (POC) 6.8 11/10/2017 09:22 AM   Metformin      Hypertension:  On Lisinopril. Refused statin     Vitamin D - dairy intake       Graves disease   Methimazole since4/17  Methimazole 2.5 mg ,    Transaminitis: Mild, asymptomatic  She had it even before starting the methimazole  Discussed about getting ultrasound of the right upper quadrant,  Follow-up with PCP      There are no Patient Instructions on file for this visit. Thank you for allowing me to participate in the care of this patient.     Lizbeth Lloyd MD      Patient verbalized understanding

## 2019-09-05 NOTE — LETTER
9/5/19 Patient: Sonny Marx YOB: 1965 Date of Visit: 9/5/2019 Mine Fabian MD 
[address] [place] [address] VIA In Basket Dear Mine Fabian MD, Thank you for referring Ms. Kem Griffin to [address] [address] for evaluation. My notes for this consultation are attached. If you have questions, please do not hesitate to call me. I look forward to following your patient along with you. Sincerely, Iliana Zhou MD

## 2019-10-04 DIAGNOSIS — I10 ESSENTIAL HYPERTENSION: ICD-10-CM

## 2019-10-04 DIAGNOSIS — E11.65 UNCONTROLLED TYPE 2 DIABETES MELLITUS WITH HYPERGLYCEMIA, WITHOUT LONG-TERM CURRENT USE OF INSULIN (HCC): ICD-10-CM

## 2019-10-04 RX ORDER — METFORMIN HYDROCHLORIDE 1000 MG/1
TABLET ORAL
Qty: 180 TAB | Refills: 1 | Status: SHIPPED | OUTPATIENT
Start: 2019-10-04 | End: 2020-01-24 | Stop reason: SDUPTHER

## 2019-11-18 ENCOUNTER — OFFICE VISIT (OUTPATIENT)
Dept: FAMILY MEDICINE CLINIC | Age: 54
End: 2019-11-18

## 2019-11-18 VITALS
TEMPERATURE: 98.8 F | WEIGHT: 155 LBS | BODY MASS INDEX: 27.46 KG/M2 | RESPIRATION RATE: 17 BRPM | HEART RATE: 89 BPM | SYSTOLIC BLOOD PRESSURE: 132 MMHG | DIASTOLIC BLOOD PRESSURE: 81 MMHG | HEIGHT: 63 IN | OXYGEN SATURATION: 98 %

## 2019-11-18 DIAGNOSIS — Z00.00 PHYSICAL EXAM: Primary | ICD-10-CM

## 2019-11-18 DIAGNOSIS — E55.9 VITAMIN D DEFICIENCY: ICD-10-CM

## 2019-11-18 DIAGNOSIS — I10 ESSENTIAL HYPERTENSION: ICD-10-CM

## 2019-11-18 DIAGNOSIS — E05.90 HYPERTHYROIDISM: ICD-10-CM

## 2019-11-18 DIAGNOSIS — E11.65 TYPE 2 DIABETES MELLITUS WITH HYPERGLYCEMIA, WITHOUT LONG-TERM CURRENT USE OF INSULIN (HCC): ICD-10-CM

## 2019-11-18 NOTE — PROGRESS NOTES
Chief Complaint   Patient presents with    Complete Physical     she is a 47y.o. year old female who presents for evalution. Reviewed PmHx, RxHx, FmHx, SocHx, AllgHx and updated and dated in the chart.     Aspirin yes ____   No____ N/A____    Patient Active Problem List    Diagnosis    Graves disease    Type 2 diabetes mellitus with hyperglycemia, without long-term current use of insulin ([facility] [address]    BMI 28.0-28.9,adult    Overweight    Type II diabetes mellitus, uncontrolled ([address]    Essential hypertension    Vitamin D deficiency    Overweight(278.02)    Type II or unspecified type diabetes mellitus without mention of complication, uncontrolled       Nurse notes were reviewed and copied and are correct  Review of Systems - negative except as listed above in the HPI    Objective:     Vitals:    11/18/19 1523   BP: 132/81   Pulse: 89   Resp: 17   Temp: 98.8 °F (37.1 °C)   TempSrc: Oral   SpO2: 98%   Weight: 155 lb (70.3 kg)   Height: 5' 3\" (1.6 m)       Physical Examination: General appearance - alert, well appearing, and in no distress  Mental status - alert, oriented to person, place, and time  Eyes - pupils equal and reactive, extraocular eye movements intact  Ears - bilateral TM's and external ear canals normal  Mouth - mucous membranes moist, pharynx normal without lesions  Neck - supple, no significant adenopathy  Chest - clear to auscultation, no wheezes, rales or rhonchi, symmetric air entry  Heart - normal rate, regular rhythm, normal S1, S2, no murmurs, rubs, clicks or gallops  Abdomen - soft, nontender, nondistended, no masses or organomegaly  Neurological - alert, oriented, normal speech, no focal findings or movement disorder noted  Musculoskeletal - no joint tenderness, deformity or swelling  Extremities - peripheral pulses normal, no pedal edema, no clubbing or cyanosis  Skin - normal coloration and turgor, no rashes, no suspicious skin lesions noted      Assessment/ Plan:   Diagnoses and all orders for this visit:    1. Physical exam  Normal exam  2. Hyperthyroidism  eval and treat as indicated  3. Type 2 diabetes mellitus with hyperglycemia, without long-term current use of insulin (HCC)  Well controlled  4. Essential hypertension  Well controlled  5. Vitamin D deficiency       Follow-up and Dispositions    · Return in about 3 months (around 2/18/2020). ICD-10-CM ICD-9-CM    1. Physical exam Z00.00 V70.9    2. Hyperthyroidism E05.90 242.90    3. Type 2 diabetes mellitus with hyperglycemia, without long-term current use of insulin (HCC) E11.65 250.00      790.29    4. Essential hypertension I10 401.9    5. Vitamin D deficiency E55.9 268.9        I have discussed the diagnosis with the patient and the intended plan as seen in the above orders. The patient has received an after-visit summary and questions were answered concerning future plans. Medication Side Effects and Warnings were discussed with patient: yes  Patient Labs were reviewed and or requested: yes  Patient Past Records were reviewed and or requested: yes        There are no Patient Instructions on file for this visit.     The patient verbalizes understanding and agrees with the plan of care        Patient has the advanced directives booklet to review

## 2019-11-18 NOTE — PROGRESS NOTES
1. Have you been to the ER, urgent care clinic since your last visit? Hospitalized since your last visit? No    2. Have you seen or consulted any other health care providers outside of the [address] [place] [address] since your last visit? Include any pap smears or colon screening.  endorine     Chief Complaint   Patient presents with    Complete Physical

## 2019-11-19 ENCOUNTER — HOSPITAL ENCOUNTER (OUTPATIENT)
Dept: LAB | Age: 54
Discharge: HOME | End: 2019-11-19

## 2019-11-19 DIAGNOSIS — I10 ESSENTIAL HYPERTENSION: ICD-10-CM

## 2019-11-19 DIAGNOSIS — E11.65 TYPE 2 DIABETES MELLITUS WITH HYPERGLYCEMIA, WITHOUT LONG-TERM CURRENT USE OF INSULIN (HCC): ICD-10-CM

## 2019-11-19 DIAGNOSIS — E05.90 HYPERTHYROIDISM: ICD-10-CM

## 2019-11-19 DIAGNOSIS — E05.90 HYPERTHYROIDISM: Primary | ICD-10-CM

## 2019-11-19 LAB
ALBUMIN SERPL-MCNC: 4.4 G/DL (ref 3.5–5)
ALBUMIN/GLOB SERPL: 1.3 {RATIO} (ref 1.1–2.2)
ALP SERPL-CCNC: 72 U/L (ref 45–117)
ALT SERPL-CCNC: 67 U/L (ref 12–78)
ANION GAP SERPL CALC-SCNC: 5 MMOL/L (ref 5–15)
AST SERPL-CCNC: 33 U/L (ref 15–37)
BILIRUB SERPL-MCNC: 0.4 MG/DL (ref 0.2–1)
BUN SERPL-MCNC: 10 MG/DL (ref 6–20)
BUN/CREAT SERPL: 14 (ref 12–20)
CALCIUM SERPL-MCNC: 9.7 MG/DL (ref 8.5–10.1)
CHLORIDE SERPL-SCNC: 106 MMOL/L (ref 97–108)
CO2 SERPL-SCNC: 29 MMOL/L (ref 21–32)
CREAT SERPL-MCNC: 0.71 MG/DL (ref 0.55–1.02)
EST. AVERAGE GLUCOSE BLD GHB EST-MCNC: 151 MG/DL
GLOBULIN SER CALC-MCNC: 3.4 G/DL (ref 2–4)
GLUCOSE SERPL-MCNC: 102 MG/DL (ref 65–100)
HBA1C MFR BLD: 6.9 % (ref 4–5.6)
POTASSIUM SERPL-SCNC: 4.3 MMOL/L (ref 3.5–5.1)
PROT SERPL-MCNC: 7.8 G/DL (ref 6.4–8.2)
SODIUM SERPL-SCNC: 140 MMOL/L (ref 136–145)
T4 FREE SERPL-MCNC: 1.2 NG/DL (ref 0.8–1.5)
TSH SERPL DL<=0.05 MIU/L-ACNC: 0.03 UIU/ML (ref 0.36–3.74)

## 2019-12-04 NOTE — PROGRESS NOTES
The blood sugar control is better  The thyroid level is still a little high.  Consult with your endocrine doc  The liver and kidney are normal.

## 2019-12-06 NOTE — PROGRESS NOTES
Spoke with pt advised of lab results/recommendations. Pt verbalized understanding and no further questions. Letter with results sent to pt address on file as requested.

## 2020-01-11 DIAGNOSIS — I10 ESSENTIAL HYPERTENSION: ICD-10-CM

## 2020-01-11 DIAGNOSIS — E11.65 UNCONTROLLED TYPE 2 DIABETES MELLITUS WITH HYPERGLYCEMIA, WITHOUT LONG-TERM CURRENT USE OF INSULIN (HCC): ICD-10-CM

## 2020-01-11 RX ORDER — LISINOPRIL 5 MG/1
TABLET ORAL
Qty: 90 TAB | Refills: 0 | Status: SHIPPED | OUTPATIENT
Start: 2020-01-11 | End: 2020-06-15

## 2020-01-17 ENCOUNTER — HOSPITAL ENCOUNTER (OUTPATIENT)
Dept: LAB | Age: 55
Discharge: HOME | End: 2020-01-17

## 2020-01-17 DIAGNOSIS — E05.00 DIFFUSE TOXIC GOITER: ICD-10-CM

## 2020-01-17 DIAGNOSIS — I10 ESSENTIAL HYPERTENSION, BENIGN: ICD-10-CM

## 2020-01-18 LAB
ALBUMIN SERPL-MCNC: 4.2 G/DL (ref 3.5–5)
ALBUMIN/GLOB SERPL: 1.3 {RATIO} (ref 1.1–2.2)
ALP SERPL-CCNC: 70 U/L (ref 45–117)
ALT SERPL-CCNC: 45 U/L (ref 12–78)
ANION GAP SERPL CALC-SCNC: 6 MMOL/L (ref 5–15)
AST SERPL-CCNC: 19 U/L (ref 15–37)
BILIRUB SERPL-MCNC: 0.4 MG/DL (ref 0.2–1)
BUN SERPL-MCNC: 7 MG/DL (ref 6–20)
BUN/CREAT SERPL: 10 (ref 12–20)
CALCIUM SERPL-MCNC: 9.5 MG/DL (ref 8.5–10.1)
CHLORIDE SERPL-SCNC: 107 MMOL/L (ref 97–108)
CO2 SERPL-SCNC: 28 MMOL/L (ref 21–32)
CREAT SERPL-MCNC: 0.71 MG/DL (ref 0.55–1.02)
EST. AVERAGE GLUCOSE BLD GHB EST-MCNC: 163 MG/DL
GLOBULIN SER CALC-MCNC: 3.3 G/DL (ref 2–4)
GLUCOSE SERPL-MCNC: 101 MG/DL (ref 65–100)
HBA1C MFR BLD: 7.3 % (ref 4–5.6)
POTASSIUM SERPL-SCNC: 4.5 MMOL/L (ref 3.5–5.1)
PROT SERPL-MCNC: 7.5 G/DL (ref 6.4–8.2)
SODIUM SERPL-SCNC: 141 MMOL/L (ref 136–145)
T4 FREE SERPL-MCNC: 1.3 NG/DL (ref 0.8–1.5)
TSH SERPL DL<=0.05 MIU/L-ACNC: 0.01 UIU/ML (ref 0.36–3.74)

## 2020-01-23 NOTE — PROGRESS NOTES
Guerline Cross is a 47 y.o. female here for   Chief Complaint   Patient presents with    Diabetes       Functional glucose monitor and record keeping system? -yes   Eye exam within last year? - on file  Foot exam within last year? - on file    1. Have you been to the ER, urgent care clinic since your last visit? Hospitalized since your last visit? -no    2. Have you seen or consulted any other health care providers outside of the [address] [place] [address] since your last visit?   Include any pap smears or colon screening.-no

## 2020-01-24 ENCOUNTER — OFFICE VISIT (OUTPATIENT)
Dept: ENDOCRINOLOGY | Age: 55
End: 2020-01-24

## 2020-01-24 VITALS
BODY MASS INDEX: 27.64 KG/M2 | TEMPERATURE: 97.8 F | HEART RATE: 118 BPM | RESPIRATION RATE: 14 BRPM | HEIGHT: 63 IN | OXYGEN SATURATION: 99 % | SYSTOLIC BLOOD PRESSURE: 124 MMHG | DIASTOLIC BLOOD PRESSURE: 77 MMHG | WEIGHT: 156 LBS

## 2020-01-24 DIAGNOSIS — E11.65 TYPE 2 DIABETES MELLITUS WITH HYPERGLYCEMIA, WITHOUT LONG-TERM CURRENT USE OF INSULIN (HCC): Primary | ICD-10-CM

## 2020-01-24 DIAGNOSIS — E05.00 GRAVES DISEASE: ICD-10-CM

## 2020-01-24 DIAGNOSIS — I10 ESSENTIAL HYPERTENSION: ICD-10-CM

## 2020-01-24 DIAGNOSIS — E05.90 HYPERTHYROIDISM: ICD-10-CM

## 2020-01-24 DIAGNOSIS — E11.65 UNCONTROLLED TYPE 2 DIABETES MELLITUS WITH HYPERGLYCEMIA, WITHOUT LONG-TERM CURRENT USE OF INSULIN (HCC): ICD-10-CM

## 2020-01-24 RX ORDER — METHIMAZOLE 5 MG/1
TABLET ORAL
Qty: 90 TAB | Refills: 3 | Status: SHIPPED | OUTPATIENT
Start: 2020-01-24 | End: 2021-02-19

## 2020-01-24 RX ORDER — METFORMIN HYDROCHLORIDE 1000 MG/1
TABLET ORAL
Qty: 180 TAB | Refills: 3 | Status: SHIPPED | OUTPATIENT
Start: 2020-01-24 | End: 2021-02-19

## 2020-01-24 NOTE — PROGRESS NOTES
Jackolyn Nissen ,MD        Patient Information  Date:1/24/2020  Name : Lenny Connelly 47 y.o.     YOB: 1965         Referred by: Carlos Hair MD         Chief Complaint   Patient presents with    Diabetes       History of Present Illness: Lenny Connelly is a 47 y.o. female here for follow up of  Type 2 Diabetes Mellitus. She was diagnosed with diabetes in 2013   She is eating healthy, exercising  She is on Metformin 1000 mg twice daily   Off nesina  She is taking methimazole consistently, 2.5 mg    No high-grade fever or sores in the mouth  No chest pain, shortness of breath, palpitations    She has Apps on her phone to track calories which is helping          Wt Readings from Last 3 Encounters:   01/24/20 156 lb (70.8 kg)   11/18/19 155 lb (70.3 kg)   09/05/19 156 lb (70.8 kg)       BP Readings from Last 3 Encounters:   01/24/20 124/77   11/18/19 132/81   09/05/19 138/86           Past Medical History:   Diagnosis Date    Diabetes ([facility] [address]     HTN (hypertension)     Hyperthyroidism      Current Outpatient Medications   Medication Sig    lisinopril (PRINIVIL, ZESTRIL) 5 mg tablet TAKE ONE TABLET BY MOUTH DAILY    metFORMIN (GLUCOPHAGE) 1,000 mg tablet TAKE ONE TABLET BY MOUTH TWICE A DAY WITH MEALS    methIMAzole (TAPAZOLE) 5 mg tablet TAKE ONE TABLET BY MOUTH DAILY (Patient taking differently: Take 2.5 mg by mouth daily.)    Lancets misc Test blood glucose once daily    glucose blood VI test strips (ONETOUCH VERIO) strip Test blood glucose daily    aspirin delayed-release 81 mg tablet Take 1 Tab by mouth daily.  biotin 2,500 mcg tab Take 1 Tab by mouth daily.  ALOE VERA Take 1 Cap by mouth daily. No current facility-administered medications for this visit.       No Known Allergies      Review of Systems:  - Constitutional Symptoms: no fevers, no chills  - Eyes: no blurry vision no double vision  - Cardiovascular: no chest pain ,no palpitations  - Respiratory: no cough no shortness of breath  - Gastrointestinal: no dysphagia no  abdominal pain  - Musculoskeletal: no joint pains no  weakness  - Integumentary: no rashes  -     Physical Examination:   Blood pressure 124/77, pulse (!) 118, temperature 97.8 °F (36.6 °C), temperature source Oral, resp. rate 14, height 5' 3\" (1.6 m), weight 156 lb (70.8 kg), SpO2 99 %. Estimated body mass index is 27.63 kg/m² as calculated from the following:    Height as of this encounter: 5' 3\" (1.6 m). -   Weight as of this encounter: 156 lb (70.8 kg).   - General: pleasant, no distress, good eye contact  - HEENT: no pallor, no periorbital edema, EOMI  - Neck: supple, no thyromegaly  - Cardiovascular: regular, tachycardia  normal S1 and S2  - Respiratory: clear to auscultation bilaterally  -   -   - Neurological: ,alert and oriented  - Psychiatric: normal mood and affect  - Skin: color, texture, turgor normal.   Diabetic foot exam: September 2019     Left:     Vibratory sensation normal    Filament test normal sensation with micro filament   Pulse DP: 1+    Deformities: None  Right:    Vibratory sensation normal   Filament test normal sensation with micro filament   Pulse DP: 1+   Deformities: None      Data Reviewed:       Lab Results   Component Value Date/Time    Hemoglobin A1c 7.3 (H) 01/17/2020 08:44 AM    Hemoglobin A1c 6.9 (H) 11/19/2019 10:00 AM    Hemoglobin A1c 7.3 (H) 08/29/2019 10:06 AM    Glucose 101 (H) 01/17/2020 08:44 AM    Glucose  11/10/2017 09:22 AM    Microalb/Creat ratio (ug/mg creat.) 8.1 01/30/2019 10:32 AM    LDL,Direct 64 05/10/2016 08:41 AM    LDL, calculated 82 01/30/2019 10:32 AM    Creatinine 0.71 01/17/2020 08:44 AM      Lab Results   Component Value Date/Time    Cholesterol, total 162 01/30/2019 10:32 AM    HDL Cholesterol 62 01/30/2019 10:32 AM    LDL,Direct 64 05/10/2016 08:41 AM    LDL, calculated 82 01/30/2019 10:32 AM    Triglyceride 90 01/30/2019 10:32 AM Lab Results   Component Value Date/Time    ALT (SGPT) 45 01/17/2020 08:44 AM    AST (SGOT) 19 01/17/2020 08:44 AM    Alk. phosphatase 70 01/17/2020 08:44 AM    Bilirubin, total 0.4 01/17/2020 08:44 AM     Lab Results   Component Value Date/Time    GFR est AA >60 01/17/2020 08:44 AM    GFR est non-AA >60 01/17/2020 08:44 AM    Creatinine 0.71 01/17/2020 08:44 AM    BUN 7 01/17/2020 08:44 AM    Sodium 141 01/17/2020 08:44 AM    Potassium 4.5 01/17/2020 08:44 AM    Chloride 107 01/17/2020 08:44 AM    CO2 28 01/17/2020 08:44 AM      Lab Results   Component Value Date/Time    TSH 0.01 (L) 01/17/2020 08:44 AM        Assessment/Plan:     1. Type 2 diabetes mellitus with hyperglycemia, without long-term current use of insulin ([address]    2. Essential hypertension    3. Graves disease      Graves' disease      Diabetes:    Lab Results   Component Value Date/Time    Hemoglobin A1c 7.3 (H) 01/17/2020 08:44 AM    Hemoglobin A1c (POC) 6.8 11/10/2017 09:22 AM   Metformin      Hypertension:  On Lisinopril. Refused statin     Vitamin D - dairy intake       Graves disease   Methimazole since4/17  Methimazole 5 mg increased    Transaminitis: Mild, asymptomatic  She had it even before starting the methimazole  To continue follow-up with PCP      There are no Patient Instructions on file for this visit. Thank you for allowing me to participate in the care of this patient.     Williams Padilla MD      Patient verbalized understanding

## 2020-01-24 NOTE — LETTER
1/25/20 Patient: Dalila Kaba YOB: 1965 Date of Visit: 1/24/2020 Aixa Collins MD 
[address] [address] VIA In Basket Dear Aixa Collins MD, Thank you for referring Ms. [place]os [place]farland to [phone] [address] for evaluation. My notes for this consultation are attached. If you have questions, please do not hesitate to call me. I look forward to following your patient along with you. Sincerely, Viktor Morales MD

## 2020-04-20 ENCOUNTER — TELEPHONE (OUTPATIENT)
Dept: ENDOCRINOLOGY | Age: 55
End: 2020-04-20

## 2020-04-20 DIAGNOSIS — E05.90 HYPERTHYROIDISM: Primary | ICD-10-CM

## 2020-04-20 DIAGNOSIS — E11.65 UNCONTROLLED TYPE 2 DIABETES MELLITUS WITH HYPERGLYCEMIA, WITHOUT LONG-TERM CURRENT USE OF INSULIN (HCC): ICD-10-CM

## 2020-04-20 DIAGNOSIS — I10 ESSENTIAL HYPERTENSION: ICD-10-CM

## 2020-05-08 ENCOUNTER — VIRTUAL VISIT (OUTPATIENT)
Dept: FAMILY MEDICINE CLINIC | Age: 55
End: 2020-05-08

## 2020-05-08 DIAGNOSIS — N39.0 URINARY TRACT INFECTION WITHOUT HEMATURIA, SITE UNSPECIFIED: Primary | ICD-10-CM

## 2020-05-08 RX ORDER — CEPHALEXIN 500 MG/1
500 CAPSULE ORAL 2 TIMES DAILY
Qty: 10 CAP | Refills: 0 | Status: SHIPPED | OUTPATIENT
Start: 2020-05-08 | End: 2020-05-13

## 2020-05-08 NOTE — PROGRESS NOTES
Joaquin Archer is a 47 y.o. female who was seen by synchronous (real-time) audio-video technology on 5/8/2020. Consent: Joaquin Archer, who was seen by synchronous (real-time) audio-video technology, and/or her healthcare decision maker, is aware that this patient-initiated, Telehealth encounter on 5/8/2020 is a billable service, with coverage as determined by her insurance carrier. She is aware that she may receive a bill and has provided verbal consent to proceed: Yes. Assessment & Plan:   Diagnoses and all orders for this visit:    1. Urinary tract infection without hematuria, site unspecified    Other orders  -     cephALEXin (KEFLEX) 500 mg capsule; Take 1 Cap by mouth two (2) times a day for 5 days. Indications: bacterial urinary tract infection      Orders Placed This Encounter    cephALEXin (KEFLEX) 500 mg capsule     Sig: Take 1 Cap by mouth two (2) times a day for 5 days. Indications: bacterial urinary tract infection     Dispense:  10 Cap     Refill:  0     Pt seen today VIA VV for increased frequency, burning with urination for the last 4 days. Pt reports cloudy color, no blood. Pt has increased water intake and cranberry juice with no decrease in s/s. Pt has no DA, placed on Cephalexin BID for 5 days. No culture at this time due to VV. Advised pt if symptoms continue 3-5 days, call back and we will send to lab for sample. Advised to go to ER for Increased pain, fever, N/V, decreased output. Advised to increase water intake, take probiotic with food/ meds    I spent at least 23 minutes on this visit with this established patient. ([id number])    Subjective:   Joaquin Archer is a 47 y.o. female who was seen for UTI (Started Monday - frequent urination, burning, no blood, strong odor      No treatment)      Prior to Admission medications    Medication Sig Start Date End Date Taking?  Authorizing Provider   cephALEXin (KEFLEX) 500 mg capsule Take 1 Cap by mouth two (2) times a day for 5 days. Indications: bacterial urinary tract infection 5/8/20 5/13/20 Yes Layne Cool NP   methIMAzole (TAPAZOLE) 5 mg tablet TAKE ONE TABLET BY MOUTH DAILY 1/24/20  Yes Harini White MD   metFORMIN (GLUCOPHAGE) 1,000 mg tablet Take 1 tab BID 1/24/20  Yes Harini White MD   lisinopril (PRINIVIL, ZESTRIL) 5 mg tablet TAKE ONE TABLET BY MOUTH DAILY 1/11/20  Yes Harini White MD   aspirin delayed-release 81 mg tablet Take 1 Tab by mouth daily. 5/10/18   Shanelle Worrell MD   Lancets misc Test blood glucose once daily 3/7/17   Harini White MD   glucose blood VI test strips (ONETOUCH VERIO) strip Test blood glucose daily 3/7/17   Harini White MD   biotin 2,500 mcg tab Take 1 Tab by mouth daily. Provider, Historical   ALOE VERA Take 1 Cap by mouth daily.     Provider, Historical     No Known Allergies    Patient Active Problem List   Diagnosis Code    Type II or unspecified type diabetes mellitus without mention of complication, uncontrolled [id number]    Overweight(278.02) E66.3    Essential hypertension I10    Vitamin D deficiency E55.9    BMI 28.0-28.9,adult Z68.28    Overweight E66.3    Type II diabetes mellitus, uncontrolled ([address] E11.65    Type 2 diabetes mellitus with hyperglycemia, without long-term current use of insulin ([facility] E11.65    Graves disease E05.00     Patient Active Problem List    Diagnosis Date Noted    Graves disease 11/11/2017    Type 2 diabetes mellitus with hyperglycemia, without long-term current use of insulin ([address] 04/15/2017    BMI 28.0-28.9,adult 10/29/2015    Overweight 10/29/2015    Type II diabetes mellitus, uncontrolled ([address] 10/29/2015    Essential hypertension 07/28/2015    Vitamin D deficiency 07/28/2015    Overweight(278.02) 03/26/2015    Type II or unspecified type diabetes mellitus without mention of complication, uncontrolled 03/25/2015     Current Outpatient Medications   Medication Sig Dispense Refill    cephALEXin (KEFLEX) 500 mg capsule Take 1 Cap by mouth two (2) times a day for 5 days. Indications: bacterial urinary tract infection 10 Cap 0    methIMAzole (TAPAZOLE) 5 mg tablet TAKE ONE TABLET BY MOUTH DAILY 90 Tab 3    metFORMIN (GLUCOPHAGE) 1,000 mg tablet Take 1 tab  Tab 3    lisinopril (PRINIVIL, ZESTRIL) 5 mg tablet TAKE ONE TABLET BY MOUTH DAILY 90 Tab 0    aspirin delayed-release 81 mg tablet Take 1 Tab by mouth daily. 30 Tab 5    Lancets misc Test blood glucose once daily 100 Each 11    glucose blood VI test strips (ONETOUCH VERIO) strip Test blood glucose daily 100 Strip 11    biotin 2,500 mcg tab Take 1 Tab by mouth daily.  ALOE VERA Take 1 Cap by mouth daily. No Known Allergies  Past Medical History:   Diagnosis Date    Diabetes [facility]     HTN (hypertension)     Hyperthyroidism        Review of Systems   Constitutional: Negative. HENT: Negative. Eyes: Negative. Respiratory: Negative. Cardiovascular: Negative. Gastrointestinal: Negative. Genitourinary: Positive for dysuria, frequency and urgency. Negative for flank pain and hematuria. Musculoskeletal: Negative. Skin: Negative. Neurological: Negative. Psychiatric/Behavioral: Negative. Objective:   Vital Signs: (As obtained by patient/caregiver at home)  There were no vitals taken for this visit.      [INSTRUCTIONS:  \"[x]\" Indicates a positive item  \"[]\" Indicates a negative item  -- DELETE ALL ITEMS NOT EXAMINED]    Constitutional: [x] Appears well-developed and well-nourished [x] No apparent distress      [] Abnormal -     Mental status: [x] Alert and awake  [x] Oriented to person/place/time [x] Able to follow commands    [] Abnormal -     Eyes:   EOM    [x]  Normal    [] Abnormal -   Sclera  [x]  Normal    [] Abnormal -          Discharge [x]  None visible   [] Abnormal -     HENT: [x] Normocephalic, atraumatic  [] Abnormal -   [x] Mouth/Throat: Mucous membranes are moist    External Ears [x] Normal  [] Abnormal -    Neck: [x] No visualized mass [] Abnormal -     Pulmonary/Chest: [x] Respiratory effort normal   [x] No visualized signs of difficulty breathing or respiratory distress        [] Abnormal -      Musculoskeletal:   [x] Normal gait with no signs of ataxia         [x] Normal range of motion of neck        [] Abnormal -     Neurological:        [x] No Facial Asymmetry (Cranial nerve 7 motor function) (limited exam due to video visit)          [x] No gaze palsy        [] Abnormal -          Skin:        [x] No significant exanthematous lesions or discoloration noted on facial skin         [] Abnormal -            Psychiatric:       [x] Normal Affect [] Abnormal -        [x] No Hallucinations    Other pertinent observable physical exam findings:-        We discussed the expected course, resolution and complications of the diagnosis(es) in detail. Medication risks, benefits, costs, interactions, and alternatives were discussed as indicated. I advised her to contact the office if her condition worsens, changes or fails to improve as anticipated. She expressed understanding with the diagnosis(es) and plan. Sofia Ludwig is a 47 y.o. female who was evaluated by a video visit encounter for concerns as above. Patient identification was verified prior to start of the visit. A caregiver was present when appropriate. Due to this being a TeleHealth encounter (During [facility] public [facility] emergency), evaluation of the following organ systems was limited: Vitals/Constitutional/EENT/Resp/CV/GI//MS/Neuro/Skin/Heme-Lymph-Imm. Pursuant to the emergency declaration under the [facility], [facility] waiver authority and the [employer] Act, this Virtual  Visit was conducted, with patient's (and/or legal guardian's) consent, to reduce the patient's risk of exposure to COVID-19 and provide necessary medical care.      Services were provided through a video synchronous discussion virtually to substitute for in-person clinic visit.    Patient was at home, provider in office for Jamie Howard, NP

## 2020-05-08 NOTE — PROGRESS NOTES
Patient stated name &   Chief Complaint   Patient presents with    UTI     Started Monday - frequent urination, burning, no blood, strong odor      No treatment        Health Maintenance Due   Topic    Pneumococcal 0-64 years (1 of 1 - PPSV23)    DTaP/Tdap/Td series (1 - Tdap)    Shingrix Vaccine Age 49> (1 of 2)    Breast Cancer Screen Mammogram     PAP AKA CERVICAL CYTOLOGY     MICROALBUMIN Q1     Lipid Screen        Wt Readings from Last 3 Encounters:   20 156 lb (70.8 kg)   19 155 lb (70.3 kg)   19 156 lb (70.8 kg)     Temp Readings from Last 3 Encounters:   20 97.8 °F (36.6 °C) (Oral)   19 98.8 °F (37.1 °C) (Oral)   19 97.7 °F (36.5 °C) (Oral)     BP Readings from Last 3 Encounters:   20 124/77   19 132/81   19 138/86     Pulse Readings from Last 3 Encounters:   20 (!) 118   19 89   19 90         Learning Assessment:  :     Learning Assessment 11/10/2017 2016   PRIMARY LEARNER Patient Patient   HIGHEST LEVEL OF EDUCATION - PRIMARY LEARNER  2 YEARS OF COLLEGE -   BARRIERS PRIMARY LEARNER NONE -   CO-LEARNER CAREGIVER No -   PRIMARY LANGUAGE ENGLISH ENGLISH   LEARNER PREFERENCE PRIMARY DEMONSTRATION DEMONSTRATION   ANSWERED BY pt pt   RELATIONSHIP SELF SELF       Depression Screening:  :     3 most recent PHQ Screens 2020   Little interest or pleasure in doing things Not at all   Feeling down, depressed, irritable, or hopeless Not at all   Total Score PHQ 2 0       Fall Risk Assessment:  :     Fall Risk Assessment, last 12 mths 11/10/2017   Able to walk? Yes   Fall in past 12 months? No       Abuse Screening:  :     Abuse Screening Questionnaire 11/10/2017   Do you ever feel afraid of your partner? N   Are you in a relationship with someone who physically or mentally threatens you? N   Is it safe for you to go home?  Y       Coordination of Care Questionnaire:  :     1) Have you been to an emergency room, urgent care clinic since your last visit? No  Hospitalized since your last visit? No             2) Have you seen or consulted any other health care providers outside of [address] [place] [address] since your last visit? No  (Include any pap smears or colon screenings in this section.)    Patient is accompanied by virtual visit I have received verbal consent from Michael Cheung to discuss any/all medical information while they are present in the room.

## 2020-06-14 DIAGNOSIS — E11.65 UNCONTROLLED TYPE 2 DIABETES MELLITUS WITH HYPERGLYCEMIA, WITHOUT LONG-TERM CURRENT USE OF INSULIN (HCC): ICD-10-CM

## 2020-06-14 DIAGNOSIS — I10 ESSENTIAL HYPERTENSION: ICD-10-CM

## 2020-06-15 RX ORDER — LISINOPRIL 5 MG/1
TABLET ORAL
Qty: 90 TAB | Refills: 0 | Status: SHIPPED | OUTPATIENT
Start: 2020-06-15 | End: 2020-11-20

## 2021-02-01 DIAGNOSIS — E05.90 HYPERTHYROIDISM: Primary | ICD-10-CM

## 2021-02-01 DIAGNOSIS — I10 ESSENTIAL HYPERTENSION: ICD-10-CM

## 2021-02-01 DIAGNOSIS — Z13.220 SCREENING FOR HYPERCHOLESTEROLEMIA: ICD-10-CM

## 2021-02-01 DIAGNOSIS — E11.65 TYPE 2 DIABETES MELLITUS WITH HYPERGLYCEMIA, WITHOUT LONG-TERM CURRENT USE OF INSULIN (HCC): ICD-10-CM

## 2021-02-02 ENCOUNTER — TELEPHONE (OUTPATIENT)
Dept: FAMILY MEDICINE CLINIC | Age: 56
End: 2021-02-02

## 2021-02-02 NOTE — TELEPHONE ENCOUNTER
Pt is scheduled for 2/24/21        ----- Message from Jordy Cochran sent at 2/2/2021  1:34 PM EST -----  Please schedule VV per Dr. Paulino Gaucher overdue for labs and need vv with me after getting the blood taken  She has no a1c since a year ago  ----- Message -----  From: Colonel Royce MD  Sent: 2/1/2021  12:51 PM EST  To: William Meyer    Please call to tell her she is overdo for labs and need vv with me after getting the blood taken  She has no a1c since a year ago  DB

## 2021-02-16 ENCOUNTER — TELEPHONE (OUTPATIENT)
Dept: ENDOCRINOLOGY | Age: 56
End: 2021-02-16

## 2021-02-16 DIAGNOSIS — E05.90 HYPERTHYROIDISM: Primary | ICD-10-CM

## 2021-02-16 NOTE — TELEPHONE ENCOUNTER
Patient called stating she did not want to be seen for apt told her that we have been doing virtual physician would not fill medications until done. Patient requesting order for labs though I [facility] which look to be the same. Can a nurse please call her as she seemed upset about situation and further instruct her.  05/15 is virtual

## 2021-02-17 NOTE — TELEPHONE ENCOUNTER
Pt states she will have labs done at PCP office next week she asked if any tests need to be added to what PCP has ordered? Pt has an appt 03/15/2021.

## 2021-02-24 ENCOUNTER — OFFICE VISIT (OUTPATIENT)
Dept: FAMILY MEDICINE CLINIC | Age: 56
End: 2021-02-24
Payer: COMMERCIAL

## 2021-02-24 VITALS
TEMPERATURE: 97.4 F | RESPIRATION RATE: 16 BRPM | BODY MASS INDEX: 27.11 KG/M2 | HEART RATE: 104 BPM | SYSTOLIC BLOOD PRESSURE: 134 MMHG | DIASTOLIC BLOOD PRESSURE: 89 MMHG | HEIGHT: 63 IN | OXYGEN SATURATION: 99 % | WEIGHT: 153 LBS

## 2021-02-24 DIAGNOSIS — E11.65 UNCONTROLLED TYPE 2 DIABETES MELLITUS WITH HYPERGLYCEMIA (HCC): ICD-10-CM

## 2021-02-24 DIAGNOSIS — Z13.220 SCREENING FOR HYPERCHOLESTEROLEMIA: ICD-10-CM

## 2021-02-24 DIAGNOSIS — K13.70 ORAL LESION: Primary | ICD-10-CM

## 2021-02-24 DIAGNOSIS — I10 ESSENTIAL HYPERTENSION: ICD-10-CM

## 2021-02-24 DIAGNOSIS — E05.90 HYPERTHYROIDISM: ICD-10-CM

## 2021-02-24 DIAGNOSIS — Z12.31 ENCOUNTER FOR SCREENING MAMMOGRAM FOR MALIGNANT NEOPLASM OF BREAST: ICD-10-CM

## 2021-02-24 LAB
ALBUMIN SERPL-MCNC: 4.8 G/DL (ref 3.5–5)
ALBUMIN/GLOB SERPL: 1.2 {RATIO} (ref 1.1–2.2)
ALP SERPL-CCNC: 94 U/L (ref 45–117)
ALT SERPL-CCNC: 46 U/L (ref 12–78)
ANION GAP SERPL CALC-SCNC: 7 MMOL/L (ref 5–15)
AST SERPL-CCNC: 19 U/L (ref 15–37)
BILIRUB SERPL-MCNC: 0.7 MG/DL (ref 0.2–1)
BUN SERPL-MCNC: 9 MG/DL (ref 6–20)
BUN/CREAT SERPL: 10 (ref 12–20)
CALCIUM SERPL-MCNC: 9.7 MG/DL (ref 8.5–10.1)
CHLORIDE SERPL-SCNC: 104 MMOL/L (ref 97–108)
CHOLEST SERPL-MCNC: 201 MG/DL
CO2 SERPL-SCNC: 28 MMOL/L (ref 21–32)
CREAT SERPL-MCNC: 0.88 MG/DL (ref 0.55–1.02)
CREAT UR-MCNC: 352 MG/DL
EST. AVERAGE GLUCOSE BLD GHB EST-MCNC: 148 MG/DL
GLOBULIN SER CALC-MCNC: 4 G/DL (ref 2–4)
GLUCOSE SERPL-MCNC: 126 MG/DL (ref 65–100)
HBA1C MFR BLD: 6.8 % (ref 4–5.6)
HDLC SERPL-MCNC: 83 MG/DL
HDLC SERPL: 2.4 {RATIO} (ref 0–5)
LDLC SERPL CALC-MCNC: 94 MG/DL (ref 0–100)
LIPID PROFILE,FLP: ABNORMAL
MICROALBUMIN UR-MCNC: 10.1 MG/DL
MICROALBUMIN/CREAT UR-RTO: 29 MG/G (ref 0–30)
POTASSIUM SERPL-SCNC: 4 MMOL/L (ref 3.5–5.1)
PROT SERPL-MCNC: 8.8 G/DL (ref 6.4–8.2)
SODIUM SERPL-SCNC: 139 MMOL/L (ref 136–145)
T4 FREE SERPL-MCNC: 0.9 NG/DL (ref 0.8–1.5)
TRIGL SERPL-MCNC: 120 MG/DL (ref ?–150)
TSH SERPL DL<=0.05 MIU/L-ACNC: 2.05 UIU/ML (ref 0.36–3.74)
VLDLC SERPL CALC-MCNC: 24 MG/DL

## 2021-02-24 PROCEDURE — 99214 OFFICE O/P EST MOD 30 MIN: CPT | Performed by: FAMILY MEDICINE

## 2021-02-24 NOTE — PROGRESS NOTES
1. Have you been to the ER, urgent care clinic since your last visit? Hospitalized since your last visit? No    2. Have you seen or consulted any other health care providers outside of the [address] [place] [address] since your last visit? Include any pap smears or colon screening.  No     Chief Complaint   Patient presents with    Complete Physical     Patient states will reschedule for PAP     3 most recent PHQ Screens 2/24/2021   Little interest or pleasure in doing things Not at all   Feeling down, depressed, irritable, or hopeless Not at all   Total Score PHQ 2 0     Health Maintenance Due   Topic Date Due    Pneumococcal 0-64 years (1 of 1 - PPSV23) [birth year]/1971    COVID-19 Vaccine (1 of 2) 09/20/1981    DTaP/Tdap/Td series (1 - Tdap) 09/20/1986    Shingrix Vaccine Age 50> (1 of 2) 09/20/2015    Breast Cancer Screen Mammogram  11/16/2018    PAP AKA CERVICAL CYTOLOGY  07/19/2019    MICROALBUMIN Q1  01/30/2020    Lipid Screen  01/30/2020    Eye Exam Retinal or Dilated  08/09/2020    Flu Vaccine (1) 09/01/2020    Foot Exam Q1  09/05/2020    A1C test (Diabetic or Prediabetic)  01/17/2021     Visit Vitals  /89 (BP 1 Location: Left upper arm, BP Patient Position: Sitting, BP Cuff Size: Adult)   Pulse (!) 104   Temp 97.4 °F (36.3 °C) (Skin)   Resp 16   Ht 5' 3\" (1.6 m)   Wt 153 lb (69.4 kg)   SpO2 99%   BMI 27.10 kg/m²

## 2021-02-24 NOTE — PROGRESS NOTES
Chief Complaint   Patient presents with    Complete Physical     Patient states will reschedule for PAP     she is a 54y.o. year old female who presents for evalution. No complaints    Here for a full physical but also has a new problem and some chronic conditions that require care today  Has a white spot inside her mouth that she wants evaluated  Due to covid 19 limitations, I do not have proper PPE to uncover her mouth  Not sure how long it has been there. There is no pain ass with it    She has diabetes  And needs a1c checked  She also has hypertension and takes lisinopril daily  She has hyperthyroidism and takes methimazole  She has no recent check on liver and kidney function since a year ago  Reviewed PmHx, RxHx, FmHx, SocHx, AllgHx and updated and dated in the chart.     Aspirin yes ____   No____ N/A____    Patient Active Problem List    Diagnosis    Graves disease    Type 2 diabetes mellitus with hyperglycemia, without long-term current use of insulin ([address]    BMI 28.0-28.9,adult    Overweight    Type II diabetes mellitus, uncontrolled ([address]    Essential hypertension    Vitamin D deficiency    Overweight(278.02)    Type II or unspecified type diabetes mellitus without mention of complication, uncontrolled       Nurse notes were reviewed and copied and are correct  Review of Systems - negative except as listed above in the HPI    Objective:     Vitals:    02/24/21 1124   BP: 134/89   Pulse: (!) 104   Resp: 16   Temp: 97.4 °F (36.3 °C)   TempSrc: Skin   SpO2: 99%   Weight: 153 lb (69.4 kg)   Height: 5' 3\" (1.6 m)     Physical Examination: General appearance - alert, well appearing, and in no distress  Mental status - alert, oriented to person, place, and time  Eyes - pupils equal and reactive, extraocular eye movements intact  Ears - bilateral TM's and external ear canals normal  Neck - supple, no significant adenopathy  Chest - clear to auscultation, no wheezes, rales or rhonchi, symmetric air entry  Heart - normal rate, regular rhythm, normal S1, S2, no murmurs, rubs, clicks or gallops  Abdomen - soft, nontender, nondistended, no masses or organomegaly  Neurological - alert, oriented, normal speech, no focal findings or movement disorder noted  Musculoskeletal - no joint tenderness, deformity or swelling  Extremities - peripheral pulses normal, no pedal edema, no clubbing or cyanosis  Skin - normal coloration and turgor, no rashes, no suspicious skin lesions noted         Assessment/ Plan:   Diagnoses and all orders for this visit:    1. Physical exam    2. Oral lesion  -     REFERRAL TO ENT-OTOLARYNGOLOGY    3. Uncontrolled type 2 diabetes mellitus with hyperglycemia (HCC)  -     MICROALBUMIN, UR, RAND W/ MICROALB/CREAT RATIO; Future  -      DIABETES FOOT EXAM    4. Encounter for screening mammogram for malignant neoplasm of breast  -     [facility] MAMMO BI SCREENING INCL CAD; Future    5. Hyperthyroidism  -     T4, FREE  -     TSH 3RD GENERATION    6. Screening for hypercholesterolemia  -     LIPID PANEL    7. Type 2 diabetes mellitus with hyperglycemia, without long-term current use of insulin (HCC)  -     METABOLIC PANEL, COMPREHENSIVE  -     HEMOGLOBIN A1C WITH EAG  Cont same meds and check control  8. Essential hypertension  -     METABOLIC PANEL, COMPREHENSIVE     bp upper limit of normal  No change at this time in meds but she was counseled   On low sodium healthy eating and exercise    Follow-up and Dispositions    · Return in about 3 months (around 5/24/2021). ICD-10-CM ICD-9-CM    1. Oral lesion  K13.70 528.9 REFERRAL TO ENT-OTOLARYNGOLOGY   2. Uncontrolled type 2 diabetes mellitus with hyperglycemia (HCC)  E11.65 250.02 MICROALBUMIN, UR, RAND W/ MICROALB/CREAT RATIO       DIABETES FOOT EXAM      MICROALBUMIN, UR, RAND W/ MICROALB/CREAT RATIO      METABOLIC PANEL, COMPREHENSIVE      HEMOGLOBIN A1C WITH EAG   3.  Encounter for screening mammogram for malignant neoplasm of breast  Z12.31 V76.12 [facility] MAMMO BI SCREENING INCL CAD   4. Hyperthyroidism  E05.90 242.90 T4, FREE      TSH 3RD GENERATION   5. Screening for hypercholesterolemia  Z13.220 V77.91 LIPID PANEL   6. Essential hypertension  P68 071.0 METABOLIC PANEL, COMPREHENSIVE       I have discussed the diagnosis with the patient and the intended plan as seen in the above orders. The patient has received an after-visit summary and questions were answered concerning future plans. There are no Patient Instructions on file for this visit.     The patient verbalizes understanding and agrees with the plan of care        Patient has the advanced directives booklet to review

## 2021-02-26 ENCOUNTER — TELEPHONE (OUTPATIENT)
Dept: FAMILY MEDICINE CLINIC | Age: 56
End: 2021-02-26

## 2021-02-26 NOTE — TELEPHONE ENCOUNTER
Two patient Identification confirmed. Patient notified. The blood sugar is better and in good control   The kidney and liver are normal   The cholesterol is not at goal. The LDL goal is less than 70 for diabetics   Avoid fried food and fast food and junk food.  Exercise at least 150 mins per week and recheck in 3 months   The thyroid hormone is normal      Patient verbalized understanding.

## 2021-02-26 NOTE — PROGRESS NOTES
The blood sugar is better and in good control  The kidney and liver are normal  The cholesterol is not at goal. The LDL goal is less than 70 for diabetics  Avoid fried food and fast food and junk food.   Exercise at least 150 mins per week and recheck in 3 months  The thyroid hormone is normal

## 2021-03-08 ENCOUNTER — TELEPHONE (OUTPATIENT)
Dept: FAMILY MEDICINE CLINIC | Age: 56
End: 2021-03-08

## 2021-03-08 NOTE — TELEPHONE ENCOUNTER
Pt is requesting to have her lab results mailed to her and to also get the name of the dentist that Dr. Elizabeth Carranza sent her to.     Pt also has an appt on 3/11/2021

## 2021-03-11 ENCOUNTER — HOSPITAL ENCOUNTER (OUTPATIENT)
Dept: LAB | Age: 56
Discharge: HOME | End: 2021-03-11
Payer: COMMERCIAL

## 2021-03-11 ENCOUNTER — OFFICE VISIT (OUTPATIENT)
Dept: FAMILY MEDICINE CLINIC | Age: 56
End: 2021-03-11
Payer: COMMERCIAL

## 2021-03-11 VITALS
HEIGHT: 63 IN | RESPIRATION RATE: 16 BRPM | TEMPERATURE: 99 F | HEART RATE: 107 BPM | WEIGHT: 156.6 LBS | OXYGEN SATURATION: 100 % | BODY MASS INDEX: 27.75 KG/M2 | DIASTOLIC BLOOD PRESSURE: 85 MMHG | SYSTOLIC BLOOD PRESSURE: 126 MMHG

## 2021-03-11 DIAGNOSIS — Z01.419 WOMEN'S ANNUAL ROUTINE GYNECOLOGICAL EXAMINATION: Primary | ICD-10-CM

## 2021-03-11 DIAGNOSIS — N84.1 CERVICAL POLYP: ICD-10-CM

## 2021-03-11 PROCEDURE — 88175 CYTOPATH C/V AUTO FLUID REDO: CPT

## 2021-03-11 PROCEDURE — 99396 PREV VISIT EST AGE 40-64: CPT | Performed by: FAMILY MEDICINE

## 2021-03-11 NOTE — PROGRESS NOTES
Chief Complaint   Patient presents with    Well Woman     Mammogram Scheduled  Last pap 2016    3 most recent PHQ Screens 3/11/2021   Little interest or pleasure in doing things Not at all   Feeling down, depressed, irritable, or hopeless Not at all   Total Score PHQ 2 0     Abuse Screening Questionnaire 3/11/2021   Do you ever feel afraid of your partner? N   Are you in a relationship with someone who physically or mentally threatens you? N   Is it safe for you to go home? Y     Visit Vitals  /85 (BP 1 Location: Right arm, BP Patient Position: Sitting, BP Cuff Size: Large adult)   Pulse (!) 107   Temp 99 °F (37.2 °C) (Oral)   Resp 16   Ht 5' 3\" (1.6 m)   Wt 156 lb 9.6 oz (71 kg)   SpO2 100%   BMI 27.74 kg/m²     1. Have you been to the ER, urgent care clinic since your last visit? Hospitalized since your last visit?no    2. Have you seen or consulted any other health care providers outside of the [address] [place] [address] since your last visit? Include any pap smears or colon screening.  no

## 2021-03-15 ENCOUNTER — VIRTUAL VISIT (OUTPATIENT)
Dept: ENDOCRINOLOGY | Age: 56
End: 2021-03-15
Payer: COMMERCIAL

## 2021-03-15 DIAGNOSIS — E05.00 GRAVES DISEASE: ICD-10-CM

## 2021-03-15 DIAGNOSIS — E11.65 TYPE 2 DIABETES MELLITUS WITH HYPERGLYCEMIA, WITHOUT LONG-TERM CURRENT USE OF INSULIN (HCC): Primary | ICD-10-CM

## 2021-03-15 DIAGNOSIS — E11.65 UNCONTROLLED TYPE 2 DIABETES MELLITUS WITH HYPERGLYCEMIA, WITHOUT LONG-TERM CURRENT USE OF INSULIN (HCC): ICD-10-CM

## 2021-03-15 DIAGNOSIS — I10 ESSENTIAL HYPERTENSION: ICD-10-CM

## 2021-03-15 PROCEDURE — 99214 OFFICE O/P EST MOD 30 MIN: CPT | Performed by: INTERNAL MEDICINE

## 2021-03-15 RX ORDER — METFORMIN HYDROCHLORIDE 1000 MG/1
TABLET ORAL
Qty: 180 TAB | Refills: 0 | Status: SHIPPED | OUTPATIENT
Start: 2021-03-15 | End: 2021-08-17

## 2021-03-15 NOTE — PROGRESS NOTES
Ankur Ayala is a 54 y.o. female here for   Chief Complaint   Patient presents with    Diabetes    Thyroid Problem       1. Have you been to the ER, urgent care clinic since your last visit? Hospitalized since your last visit? -no    2. Have you seen or consulted any other health care providers outside of the [address] [place] [address] since your last visit? Include any pap smears or colon screening. -PCP and GYN

## 2021-03-15 NOTE — PROGRESS NOTES
Charon Sicard ,MD        Patient Information  Date:3/15/2021  Name : Majo Mcmillan 54 y.o.     YOB: 1965         Referred by: Lisa Marsh MD         Chief Complaint   Patient presents with    Diabetes    Thyroid Problem   Pursuant to the emergency declaration under the [facility] [place] [address] [facility] authority and the [employer] Act, this Virtual  Visit was conducted, with patient's consent, to reduce the patient's risk of exposure to COVID-19 . Patient  is aware that this is a billable encounter and is responsible for copays/deductibles       Services were provided through a video synchronous discussion virtually to substitute for in-person clinic visit. Place of service: Provider : Office  Patient: Home    History of Present Illness: Majo Mcmillan is a 54 y.o. female here for follow up of  Type 2 Diabetes Mellitus.      She was diagnosed with diabetes in 2013   She is eating healthy, exercising  She is on Metformin 1000 mg twice daily   Off nesina  She is taking methimazole consistently  Last set of thyroid blood test normal    No high-grade fever or sores in the mouth  No chest pain, shortness of breath, palpitations              Wt Readings from Last 3 Encounters:   03/11/21 156 lb 9.6 oz (71 kg)   02/24/21 153 lb (69.4 kg)   01/24/20 156 lb (70.8 kg)       BP Readings from Last 3 Encounters:   03/11/21 126/85   02/24/21 134/89   01/24/20 124/77           Past Medical History:   Diagnosis Date    Diabetes ([facility] [address]     HTN (hypertension)     Hyperthyroidism      Current Outpatient Medications   Medication Sig    methIMAzole (TAPAZOLE) 5 mg tablet TAKE ONE TABLET BY MOUTH DAILY    metFORMIN (GLUCOPHAGE) 1,000 mg tablet TAKE ONE TABLET BY MOUTH TWICE A DAY    lisinopriL (PRINIVIL, ZESTRIL) 5 mg tablet TAKE ONE TABLET BY MOUTH DAILY    Lancets misc Test blood glucose once daily    aspirin delayed-release 81 mg tablet Take 1 Tab by mouth daily.  glucose blood VI test strips (ONETOUCH VERIO) strip Test blood glucose daily    biotin 2,500 mcg tab Take 1 Tab by mouth daily.  ALOE VERA Take 1 Cap by mouth daily. No current facility-administered medications for this visit. No Known Allergies      Review of Systems:  - Per HPI  -     Physical Examination:   There were no vitals taken for this visit. Estimated body mass index is 27.74 kg/m² as calculated from the following:    Height as of 3/11/21: 5' 3\" (1.6 m). -   Weight as of 3/11/21: 156 lb 9.6 oz (71 kg). - General: pleasant, no distress, good eye contact  - HEENT: no exophthalmos, no periorbital edema, EOMI  - Neck: No visible thyromegaly  - RS: Normal respiratory effort  - Musculoskeletal: no tremors  - Neurological: alert and oriented  - Psychiatric: normal mood and affect  - Skin: Normal color        Data Reviewed:       Lab Results   Component Value Date/Time    Hemoglobin A1c 6.8 (H) 02/24/2021 11:57 AM    Hemoglobin A1c 7.3 (H) 01/17/2020 08:44 AM    Hemoglobin A1c 6.9 (H) 11/19/2019 10:00 AM    Glucose 126 (H) 02/24/2021 11:57 AM    Glucose  11/10/2017 09:22 AM    Microalbumin/Creat ratio (mg/g creat) 29 02/24/2021 11:57 AM    Microalbumin,urine random 10.10 02/24/2021 11:57 AM    LDL,Direct 64 05/10/2016 08:41 AM    LDL, calculated 94 02/24/2021 11:57 AM    Creatinine 0.88 02/24/2021 11:57 AM      Lab Results   Component Value Date/Time    Cholesterol, total 201 (H) 02/24/2021 11:57 AM    HDL Cholesterol 83 02/24/2021 11:57 AM    LDL,Direct 64 05/10/2016 08:41 AM    LDL, calculated 94 02/24/2021 11:57 AM    Triglyceride 120 02/24/2021 11:57 AM    CHOL/HDL Ratio 2.4 02/24/2021 11:57 AM     Lab Results   Component Value Date/Time    ALT (SGPT) 46 02/24/2021 11:57 AM    Alk.  phosphatase 94 02/24/2021 11:57 AM    Bilirubin, total 0.7 02/24/2021 11:57 AM     Lab Results   Component Value Date/Time    GFR est AA >60 02/24/2021 11:57 AM    GFR est non-AA >60 02/24/2021 11:57 AM    Creatinine 0.88 02/24/2021 11:57 AM    BUN 9 02/24/2021 11:57 AM    Sodium 139 02/24/2021 11:57 AM    Potassium 4.0 02/24/2021 11:57 AM    Chloride 104 02/24/2021 11:57 AM    CO2 28 02/24/2021 11:57 AM      Lab Results   Component Value Date/Time    TSH 2.05 02/24/2021 11:57 AM        Assessment/Plan:     1. Type 2 diabetes mellitus with hyperglycemia, without long-term current use of insulin ([address]    2. Graves disease    3. Essential hypertension            Diabetes:    Lab Results   Component Value Date/Time    Hemoglobin A1c 6.8 (H) 02/24/2021 11:57 AM    Hemoglobin A1c (POC) 6.8 11/10/2017 09:22 AM   Metformin  Controlled    Hypertension:  On Lisinopril. Refused statin     Vitamin D - dairy intake       Graves disease   Methimazole since4/17  Methimazole 5 mg , 1/2 tablet for a week and then stop (March 2021)  TFTs in 3 months    Transaminitis: Mild, asymptomatic  She had it even before starting the methimazole  To continue follow-up with PCP      There are no Patient Instructions on file for this visit. Thank you for allowing me to participate in the care of this patient.     Raphael Villalobos MD      Patient verbalized understanding

## 2021-03-18 ENCOUNTER — IMMUNIZATION (OUTPATIENT)
Dept: INTERNAL MEDICINE CLINIC | Age: 56
End: 2021-03-18
Payer: COMMERCIAL

## 2021-03-18 DIAGNOSIS — Z23 ENCOUNTER FOR IMMUNIZATION: Primary | ICD-10-CM

## 2021-03-18 PROCEDURE — 0001A COVID-19, MRNA, LNP-S, PF, 30MCG/0.3ML DOSE(PFIZER): CPT | Performed by: FAMILY MEDICINE

## 2021-03-18 PROCEDURE — 91300 COVID-19, MRNA, LNP-S, PF, 30MCG/0.3ML DOSE(PFIZER): CPT | Performed by: FAMILY MEDICINE

## 2021-03-22 ENCOUNTER — TELEPHONE (OUTPATIENT)
Dept: FAMILY MEDICINE CLINIC | Age: 56
End: 2021-03-22

## 2021-03-22 NOTE — TELEPHONE ENCOUNTER
Two patient Identification confirmed. Patient notified. Patient states do you have any suggestions for GYN. Patient prefer  specialist.  Please advise  Results sent via mail to address on file.

## 2021-04-08 ENCOUNTER — IMMUNIZATION (OUTPATIENT)
Dept: INTERNAL MEDICINE CLINIC | Age: 56
End: 2021-04-08
Payer: COMMERCIAL

## 2021-04-08 DIAGNOSIS — Z23 ENCOUNTER FOR IMMUNIZATION: Primary | ICD-10-CM

## 2021-04-08 PROCEDURE — 0002A COVID-19, MRNA, LNP-S, PF, 30MCG/0.3ML DOSE(PFIZER): CPT | Performed by: FAMILY MEDICINE

## 2021-04-08 PROCEDURE — 91300 COVID-19, MRNA, LNP-S, PF, 30MCG/0.3ML DOSE(PFIZER): CPT | Performed by: FAMILY MEDICINE

## 2021-05-20 DIAGNOSIS — I10 ESSENTIAL HYPERTENSION: ICD-10-CM

## 2021-05-20 DIAGNOSIS — E05.90 HYPERTHYROIDISM: ICD-10-CM

## 2021-05-20 DIAGNOSIS — E11.65 UNCONTROLLED TYPE 2 DIABETES MELLITUS WITH HYPERGLYCEMIA, WITHOUT LONG-TERM CURRENT USE OF INSULIN (HCC): ICD-10-CM

## 2021-05-20 RX ORDER — METHIMAZOLE 5 MG/1
TABLET ORAL
Qty: 90 TABLET | Refills: 0 | Status: SHIPPED | OUTPATIENT
Start: 2021-05-20 | End: 2021-07-14

## 2021-05-20 RX ORDER — LISINOPRIL 5 MG/1
TABLET ORAL
Qty: 90 TABLET | Refills: 0 | Status: SHIPPED | OUTPATIENT
Start: 2021-05-20 | End: 2021-08-17

## 2021-05-21 ENCOUNTER — OFFICE VISIT (OUTPATIENT)
Dept: OBGYN CLINIC | Age: 56
End: 2021-05-21
Payer: COMMERCIAL

## 2021-05-21 VITALS
OXYGEN SATURATION: 99 % | SYSTOLIC BLOOD PRESSURE: 133 MMHG | BODY MASS INDEX: 27.46 KG/M2 | WEIGHT: 155 LBS | HEIGHT: 63 IN | HEART RATE: 97 BPM | RESPIRATION RATE: 16 BRPM | DIASTOLIC BLOOD PRESSURE: 79 MMHG

## 2021-05-21 DIAGNOSIS — R87.610 ATYPICAL SQUAMOUS CELLS OF UNDETERMINED SIGNIFICANCE (ASCUS) ON PAPANICOLAOU SMEAR OF CERVIX: Primary | ICD-10-CM

## 2021-05-21 PROCEDURE — 99202 OFFICE O/P NEW SF 15 MIN: CPT | Performed by: OBSTETRICS & GYNECOLOGY

## 2021-05-21 PROCEDURE — 57454 BX/CURETT OF CERVIX W/SCOPE: CPT | Performed by: OBSTETRICS & GYNECOLOGY

## 2021-05-21 NOTE — PATIENT INSTRUCTIONS
Colposcopy: What to Expect at [address] may feel some soreness in your vagina for a day or two if you had a biopsy. Some vaginal bleeding or discharge is normal for up to a week after a biopsy. The discharge may be dark-colored if a solution was put on your cervix. You can use a sanitary pad for the bleeding. It may take a week or two for you to get the test results. This care sheet gives you a general idea about how long it will take for you to recover. But each person recovers at a different pace. Follow the steps below to feel better as quickly as possible. How can you care for yourself at home? Activity    · You can return to work and most daily activities right after the test.   Exercise    · Do not exercise for 1 day after the test.   Medicines    · Your doctor will tell you if and when you can restart your medicines. You will also get instructions about taking any new medicines.     · If you take aspirin or some other blood thinner, ask your doctor if and when to start taking it again. Make sure that you understand exactly what your doctor wants you to do.     · Take an over-the-counter pain medicine, such as acetaminophen (Tylenol), ibuprofen (Advil, Motrin), or naproxen (Aleve). Be safe with medicines. Read and follow all instructions on the label. Do not take two or more pain medicines at the same time unless the doctor told you to. Many pain medicines have acetaminophen, which is Tylenol. Too much acetaminophen (Tylenol) can be harmful. Other instructions    · Some vaginal bleeding or discharge is normal for up to a week after a biopsy. The discharge may be dark-colored. Use a pad if you have some bleeding.     · Do not douche, have sexual intercourse, or use tampons for 1 week if you had a biopsy. This will allow time for your cervix to heal.     · You can take a bath or shower anytime after the test.   Follow-up care is a key part of your treatment and safety.  Be sure to make and go to all appointments, and call your doctor if you are having problems. It's also a good idea to know your test results and keep a list of the medicines you take. When should you call for help? Call your doctor now or seek immediate medical care if:    · You have severe vaginal bleeding. This means that you are soaking through your usual pads or tampons each hour for 2 or more hours.     · You have pain that does not get better after you take pain medicine.     · You have signs of infection, such as:  ? Increased pain. ? Bad-smelling vaginal discharge. ? A fever. Watch closely for any changes in your health, and be sure to contact your doctor if:    · You have questions or concerns. Where can you learn more? Go to http://www.gray.com/  Enter M523 in the search box to learn more about \"Colposcopy: What to Expect at Home. \"  Current as of: December 17, 2020               Content Version: 12.8  © 2006-2021 [employer]. Care instructions adapted under license by [employer] (which disclaims liability or warranty for this information). If you have questions about a medical condition or this instruction, always ask your healthcare professional. [address] any warranty or liability for your use of this information.

## 2021-05-23 NOTE — PROGRESS NOTES
Ryan Torrez is a 54 y.o. female, , No LMP recorded. Patient is premenopausal., who presents today for the following:  Chief Complaint   Patient presents with    New Patient     Pt here for colpo Ascus on pap in 2021        No Known Allergies    Current Outpatient Medications   Medication Sig    lisinopriL (PRINIVIL, ZESTRIL) 5 mg tablet TAKE ONE TABLET BY MOUTH DAILY    metFORMIN (GLUCOPHAGE) 1,000 mg tablet TAKE ONE TABLET BY MOUTH TWICE A DAY    Lancets misc Test blood glucose once daily    methIMAzole (TAPAZOLE) 5 mg tablet TAKE ONE TABLET BY MOUTH DAILY (Patient not taking: Reported on 2021)    aspirin delayed-release 81 mg tablet Take 1 Tab by mouth daily.  glucose blood VI test strips (ONETOUCH VERIO) strip Test blood glucose daily    biotin 2,500 mcg tab Take 1 Tab by mouth daily.  ALOE VERA Take 1 Cap by mouth daily. No current facility-administered medications for this visit. Past Medical History:   Diagnosis Date    Diabetes ([address]     HTN (hypertension)     Hyperthyroidism        Past Surgical History:   Procedure Laterality Date    HX  SECTION      HX ORTHOPAEDIC  1998    right tibia/removal of cyst in bone       Family History   Problem Relation Age of Onset    Diabetes Mother     Hypertension Mother     Cancer Sister         breast       Social History     Socioeconomic History    Marital status:      Spouse name: Not on file    Number of children: Not on file    Years of education: Not on file    Highest education level: Not on file   Occupational History    Not on file   Tobacco Use    Smoking status: Never Smoker    Smokeless tobacco: Never Used   Vaping Use    Vaping Use: Never used   Substance and Sexual Activity    Alcohol use:  Yes     Alcohol/week: 2.0 standard drinks     Types: 2 Shots of liquor per week     Comment: weekends socially    Drug use: No    Sexual activity: Yes     Partners: Male     Birth control/protection: None   Other Topics Concern    Not on file   Social History Narrative    Not on file     Social Determinants of Health     Financial Resource Strain:     Difficulty of Paying Living Expenses:    Food Insecurity:     Worried About Running Out of Food in the Last Year:     [address] in the Last Year:    Transportation Needs:     Lack of Transportation (Medical):  Lack of Transportation (Non-Medical):    Physical Activity:     Days of Exercise per Week:     Minutes of Exercise per Session:    Stress:     Feeling of Stress :    Social Connections:     Frequency of Communication with Friends and Family:     Frequency of Social Gatherings with Friends and Family:     Attends Restorationist Services:     Active Member of Clubs or Organizations:     Attends Club or Organization Meetings:     Marital Status:    Intimate Partner Violence:     Fear of Current or Ex-Partner:     Emotionally Abused:     Physically Abused:     Sexually Abused:          HPI  Patient presents for evaluation  Sent by PCP secondary to Abnormal pap  ASCUS HPV+  Denies any history of abnormal pap  Significance of pap results discussed and reviewed with patient  Management discussed and reviewed with patient  Need for colposcopic evaluation dicussed and reviewed with patient  All questions answered  Patient states comprehension  Consent obtained     Review of Systems   Constitutional: Negative. Respiratory: Negative. Cardiovascular: Negative. Gastrointestinal: Negative. Genitourinary: Negative. Musculoskeletal: Negative. Skin: Negative. Neurological: Negative. Endo/Heme/Allergies: Negative. Psychiatric/Behavioral: Negative. All other systems reviewed and are negative.          /79 (BP 1 Location: Left upper arm, BP Patient Position: Sitting)   Pulse 97   Resp 16   Ht 5' 3\" (1.6 m)   Wt 155 lb (70.3 kg)   SpO2 99%   BMI 27.46 kg/m²    OBGyn Exam   PE:  Constitutional: General Appearance: healthy-appearing, well-nourished, well-developed, and well groomed. Psychiatric: Orientation: to time, place, and person. Mood and Affect: normal mood and affect and appropriate and active and alert. Abdomen: Auscultation/Inspection/Palpation: tenderness and mass and non-distended. Hernia: none palpated. Female Genitalia: Vulva: no masses, atrophy, or lesions. Bladder/Urethra: no urethral discharge or mass and normal meatus and bladder non distended. Vagina no tenderness, erythema, cystocele, rectocele, abnormal vaginal discharge, or vesicle(s) or ulcers. Cervix: no discharge or cervical motion tenderness and grossly normal.     Uterus: mobile, non-tender, and no uterine prolapse. Adnexa/Parametria: no adnexal tenderness. Colposcopy Procedure Note    Indications:   Most recent Pap smear ASCUS HPV    Procedure Details   The risks and benefits of the procedure and written informed consent obtained. Speculum placed in vagina and excellent visualization of cervix achieved, cervix swabbed x 3 with acetic acid solution. Findings:  Cervix: no visible lesions, no mosaicism, no punctation and no abnormal vasculature; endocervical speculum placed, SCJ visualized 360 degrees without lesions and endocervical curettage performed. Vaginal inspection: normal without visible lesions. Vulvar colposcopy: vulvar colposcopy not performed. Specimens: ecc    Complications: none. Plan:  Specimens labelled and sent to Pathology. Will base further treatment on Pathology findings. .      1. Atypical squamous cells of undetermined significance (ASCUS) on Papanicolaou smear of cervix    - COLPOSC,CERVIX W/ADJ VAG,W/BX & CURRETAG  - SURGICAL PATHOLOGY        Follow-up and Dispositions    · Return in about 2 weeks (around 6/4/2021) for gyn.

## 2021-05-26 ENCOUNTER — HOSPITAL ENCOUNTER (OUTPATIENT)
Dept: MAMMOGRAPHY | Age: 56
Discharge: HOME | End: 2021-05-26
Attending: FAMILY MEDICINE
Payer: COMMERCIAL

## 2021-05-26 DIAGNOSIS — Z12.31 ENCOUNTER FOR SCREENING MAMMOGRAM FOR MALIGNANT NEOPLASM OF BREAST: ICD-10-CM

## 2021-05-26 LAB
CPT CODES, 490044: NORMAL
CPT DISCLAIMER: NORMAL
CYTOLOGY SPEC DOC CYTO: NORMAL
DIAGNOSIS SYNOPSIS:: NORMAL
DX ICD CODE: NORMAL
DX ICD CODE: NORMAL
PATH REPORT.GROSS SPEC: NORMAL
PATH REPORT.RELEVANT HX SPEC: NORMAL
PATHOLOGIST NAME: NORMAL
SPECIMEN SOURCE: NORMAL

## 2021-05-26 PROCEDURE — 77067 SCR MAMMO BI INCL CAD: CPT

## 2021-06-07 ENCOUNTER — OFFICE VISIT (OUTPATIENT)
Dept: OBGYN CLINIC | Age: 56
End: 2021-06-07
Payer: COMMERCIAL

## 2021-06-07 VITALS
RESPIRATION RATE: 16 BRPM | SYSTOLIC BLOOD PRESSURE: 135 MMHG | WEIGHT: 156 LBS | HEIGHT: 63 IN | DIASTOLIC BLOOD PRESSURE: 81 MMHG | BODY MASS INDEX: 27.64 KG/M2 | OXYGEN SATURATION: 100 % | HEART RATE: 83 BPM

## 2021-06-07 DIAGNOSIS — N86 CERVICAL EROSION/ECTROPION: ICD-10-CM

## 2021-06-07 DIAGNOSIS — N72 CHRONIC CERVICITIS: Primary | ICD-10-CM

## 2021-06-07 PROCEDURE — 99214 OFFICE O/P EST MOD 30 MIN: CPT | Performed by: OBSTETRICS & GYNECOLOGY

## 2021-06-07 RX ORDER — ESTRADIOL 0.1 MG/G
CREAM VAGINAL
Qty: 42.5 G | Refills: 0 | Status: SHIPPED | OUTPATIENT
Start: 2021-06-07 | End: 2021-07-14

## 2021-06-07 RX ORDER — DOXYCYCLINE 100 MG/1
100 TABLET ORAL 2 TIMES DAILY
Qty: 20 TABLET | Refills: 0 | Status: SHIPPED | OUTPATIENT
Start: 2021-06-07 | End: 2021-06-17

## 2021-06-14 NOTE — PROGRESS NOTES
Kash Caceres is a 54 y.o. female, , Patient's last menstrual period was 10/25/2015., who presents today for the following:  Chief Complaint   Patient presents with    Follow-up        No Known Allergies    Current Outpatient Medications   Medication Sig    estradioL (ESTRACE) 0.01 % (0.1 mg/gram) vaginal cream One applicatorful  Twice at week    doxycycline (ADOXA) 100 mg tablet Take 1 Tablet by mouth two (2) times a day for 10 days.  methIMAzole (TAPAZOLE) 5 mg tablet TAKE ONE TABLET BY MOUTH DAILY (Patient not taking: Reported on 2021)    lisinopriL (PRINIVIL, ZESTRIL) 5 mg tablet TAKE ONE TABLET BY MOUTH DAILY    metFORMIN (GLUCOPHAGE) 1,000 mg tablet TAKE ONE TABLET BY MOUTH TWICE A DAY    aspirin delayed-release 81 mg tablet Take 1 Tab by mouth daily.  Lancets misc Test blood glucose once daily    glucose blood VI test strips (ONETOUCH VERIO) strip Test blood glucose daily    biotin 2,500 mcg tab Take 1 Tab by mouth daily.  ALOE VERA Take 1 Cap by mouth daily. No current facility-administered medications for this visit. Past Medical History:   Diagnosis Date    Diabetes ([address]     HTN (hypertension)     Hyperthyroidism        Past Surgical History:   Procedure Laterality Date    HX  SECTION      HX ORTHOPAEDIC  1998    right tibia/removal of cyst in bone       Family History   Problem Relation Age of Onset    Diabetes Mother     Hypertension Mother     Cancer Sister         breast    Breast Cancer Sister        Social History     Socioeconomic History    Marital status:      Spouse name: Not on file    Number of children: Not on file    Years of education: Not on file    Highest education level: Not on file   Occupational History    Not on file   Tobacco Use    Smoking status: Never Smoker    Smokeless tobacco: Never Used   Vaping Use    Vaping Use: Never used   Substance and Sexual Activity    Alcohol use:  Yes     Alcohol/week: 2.0 standard drinks     Types: 2 Shots of liquor per week     Comment: weekends socially    Drug use: No    Sexual activity: Yes     Partners: Male     Birth control/protection: None   Other Topics Concern    Not on file   Social History Narrative    Not on file     Social Determinants of Health     Financial Resource Strain:     Difficulty of Paying Living Expenses:    Food Insecurity:     Worried About Running Out of Food in the Last Year:     [address] in the Last Year:    Transportation Needs:     Lack of Transportation (Medical):  Lack of Transportation (Non-Medical):    Physical Activity:     Days of Exercise per Week:     Minutes of Exercise per Session:    Stress:     Feeling of Stress :    Social Connections:     Frequency of Communication with Friends and Family:     Frequency of Social Gatherings with Friends and Family:     Attends J[employer] Services:     Active Member of Clubs or Organizations:     Attends Club or Organization Meetings:     Marital Status:    Intimate Partner Violence:     Fear of Current or Ex-Partner:     Emotionally Abused:     Physically Abused:     Sexually Abused:          HPI  Patient presents for follow up from colpscopy  Reports no adverse reactions following procedure  No pain no bleeding  Results reveals  Cervical Biopsy: ACUTE CERVICITIS AND EXTENSIVE   EROSION. NEGATIVE FOR DYSPLASIA, VIRAL EFFECT, AND   MALIGNANCY    Results and management reviewed and discussed with patient  All questions answered. Patient states comprehension    Review of Systems   Constitutional: Negative. Respiratory: Negative. Cardiovascular: Negative. Gastrointestinal: Negative. Genitourinary: Negative. Musculoskeletal: Negative. Skin: Negative. Neurological: Negative. Endo/Heme/Allergies: Negative. Psychiatric/Behavioral: Negative. All other systems reviewed and are negative.          /81 (BP 1 Location: Right arm, BP Patient Position: Sitting)   Pulse 83   Resp 16   Ht 5' 3\" (1.6 m)   Wt 156 lb (70.8 kg)   LMP 10/25/2015   SpO2 100%   BMI 27.63 kg/m²    OBGyn Exam     PE:  Constitutional: General Appearance: healthy-appearing, well-nourished, well-developed, and well groomed. Psychiatric: Orientation: to time, place, and person. Mood and Affect: normal mood and affect and appropriate and active and alert. Abdomen: Auscultation/Inspection/Palpation: tenderness and mass and non-distended. Hernia: none palpated. Female Genitalia: Vulva: no masses, atrophy, or lesions. Bladder/Urethra: no urethral discharge or mass and normal meatus and bladder non distended. Vagina no tenderness, erythema, cystocele, rectocele, abnormal vaginal discharge, or vesicle(s) or ulcers. Cervix: no discharge or cervical motion tenderness and grossly normal.       1. Chronic cervicitis    - doxycycline (ADOXA) 100 mg tablet; Take 1 Tablet by mouth two (2) times a day for 10 days. Dispense: 20 Tablet; Refill: 0    2.  Cervical erosion/ectropion    - estradioL (ESTRACE) 0.01 % (0.1 mg/gram) vaginal cream; One applicatorful  Twice at week  Dispense: 42.5 g; Refill: 0

## 2021-06-15 LAB
T4 FREE SERPL-MCNC: 1.27 NG/DL (ref 0.82–1.77)
TSH SERPL DL<=0.005 MIU/L-ACNC: 0.74 UIU/ML (ref 0.45–4.5)

## 2021-06-17 ENCOUNTER — TELEPHONE (OUTPATIENT)
Dept: OBGYN CLINIC | Age: 56
End: 2021-06-17

## 2021-06-17 NOTE — TELEPHONE ENCOUNTER
Returned the patient's call regarding her concern about the possibility of the Estrace vaginal cream causing the veins in her hands and legs to be more pronounced and maybe trying an alternative medication. Spoke with Dr Carroll Chao and she states if the patient has a venous issue she definitely should not take an oral HRT. Contacted the patient again and she states she has not had the problem again and doesn't know if it is related to her exercise. States she walked for a longer period of time the morning she noticed it and hasn't had the issue again. She will continue the Estradiol cream for now. She is also verifying it is okay for her to have intercourse and that it won't affect her partner. Advised her she may have intercourse.

## 2021-07-14 ENCOUNTER — OFFICE VISIT (OUTPATIENT)
Dept: ENDOCRINOLOGY | Age: 56
End: 2021-07-14
Payer: COMMERCIAL

## 2021-07-14 VITALS
TEMPERATURE: 97.5 F | HEIGHT: 63 IN | DIASTOLIC BLOOD PRESSURE: 84 MMHG | SYSTOLIC BLOOD PRESSURE: 129 MMHG | WEIGHT: 154 LBS | OXYGEN SATURATION: 98 % | HEART RATE: 109 BPM | BODY MASS INDEX: 27.29 KG/M2

## 2021-07-14 DIAGNOSIS — E05.00 GRAVES DISEASE: ICD-10-CM

## 2021-07-14 DIAGNOSIS — I10 ESSENTIAL HYPERTENSION: ICD-10-CM

## 2021-07-14 DIAGNOSIS — E11.65 TYPE 2 DIABETES MELLITUS WITH HYPERGLYCEMIA, WITHOUT LONG-TERM CURRENT USE OF INSULIN (HCC): Primary | ICD-10-CM

## 2021-07-14 LAB — HBA1C MFR BLD HPLC: 7 %

## 2021-07-14 PROCEDURE — 99214 OFFICE O/P EST MOD 30 MIN: CPT | Performed by: INTERNAL MEDICINE

## 2021-07-14 PROCEDURE — 3051F HG A1C>EQUAL 7.0%<8.0%: CPT | Performed by: INTERNAL MEDICINE

## 2021-07-14 PROCEDURE — 83036 HEMOGLOBIN GLYCOSYLATED A1C: CPT | Performed by: INTERNAL MEDICINE

## 2021-07-14 NOTE — PROGRESS NOTES
Carey Reyna MD        Patient Information  Date:7/14/2021  Name : Bryson Herrera 54 y.o.     YOB: 1965         Referred by: Reginaldo Jimenez MD         Chief Complaint   Patient presents with    Diabetes    Thyroid Problem       History of Present Illness: Bryson Herrera is a 54 y.o. female here for follow up of  Type 2 Diabetes Mellitus. She was diagnosed with diabetes in 2013   She is eating healthy, exercising  She is on Metformin 1000 mg twice daily   Off nesina  Off methimazole  Walks every day      Wt Readings from Last 3 Encounters:   07/14/21 154 lb (69.9 kg)   06/07/21 156 lb (70.8 kg)   05/21/21 155 lb (70.3 kg)       BP Readings from Last 3 Encounters:   07/14/21 129/84   06/07/21 135/81   05/21/21 133/79           Past Medical History:   Diagnosis Date    Diabetes ([facility] [address]     HTN (hypertension)     Hyperthyroidism      Current Outpatient Medications   Medication Sig    lisinopriL (PRINIVIL, ZESTRIL) 5 mg tablet TAKE ONE TABLET BY MOUTH DAILY    metFORMIN (GLUCOPHAGE) 1,000 mg tablet TAKE ONE TABLET BY MOUTH TWICE A DAY    Lancets misc Test blood glucose once daily    estradioL (ESTRACE) 0.01 % (0.1 mg/gram) vaginal cream One applicatorful  Twice at week (Patient not taking: Reported on 7/14/2021)    methIMAzole (TAPAZOLE) 5 mg tablet TAKE ONE TABLET BY MOUTH DAILY (Patient not taking: Reported on 5/21/2021)    aspirin delayed-release 81 mg tablet Take 1 Tab by mouth daily.  glucose blood VI test strips (ONETOUCH VERIO) strip Test blood glucose daily    biotin 2,500 mcg tab Take 1 Tab by mouth daily.  ALOE VERA Take 1 Cap by mouth daily. No current facility-administered medications for this visit.      No Known Allergies      Review of Systems:  - Per HPI  -     Physical Examination:   Blood pressure 129/84, pulse (!) 109, temperature 97.5 °F (36.4 °C), height 5' 3\" (1.6 m), weight 154 lb (69.9 kg), last menstrual period 10/25/2015, SpO2 98 %. Estimated body mass index is 27.28 kg/m² as calculated from the following:    Height as of this encounter: 5' 3\" (1.6 m). -   Weight as of this encounter: 154 lb (69.9 kg). - General: pleasant, no distress, good eye contact  - HEENT: no exophthalmos, no periorbital edema, EOMI  - Neck: No thyromegaly  - CVS: S1-S2 regular  - RS: Normal respiratory effort  - Musculoskeletal: no tremors  - Neurological: alert and oriented  - Psychiatric: normal mood and affect  - Skin: Normal color        Data Reviewed:       Lab Results   Component Value Date/Time    Hemoglobin A1c 6.8 (H) 02/24/2021 11:57 AM    Hemoglobin A1c 7.3 (H) 01/17/2020 08:44 AM    Hemoglobin A1c 6.9 (H) 11/19/2019 10:00 AM    Glucose 126 (H) 02/24/2021 11:57 AM    Glucose  11/10/2017 09:22 AM    Microalbumin/Creat ratio (mg/g creat) 29 02/24/2021 11:57 AM    Microalbumin,urine random 10.10 02/24/2021 11:57 AM    LDL,Direct 64 05/10/2016 08:41 AM    LDL, calculated 94 02/24/2021 11:57 AM    Creatinine 0.88 02/24/2021 11:57 AM      Lab Results   Component Value Date/Time    Cholesterol, total 201 (H) 02/24/2021 11:57 AM    HDL Cholesterol 83 02/24/2021 11:57 AM    LDL,Direct 64 05/10/2016 08:41 AM    LDL, calculated 94 02/24/2021 11:57 AM    Triglyceride 120 02/24/2021 11:57 AM    CHOL/HDL Ratio 2.4 02/24/2021 11:57 AM     Lab Results   Component Value Date/Time    ALT (SGPT) 46 02/24/2021 11:57 AM    Alk.  phosphatase 94 02/24/2021 11:57 AM    Bilirubin, total 0.7 02/24/2021 11:57 AM     Lab Results   Component Value Date/Time    GFR est AA >60 02/24/2021 11:57 AM    GFR est non-AA >60 02/24/2021 11:57 AM    Creatinine 0.88 02/24/2021 11:57 AM    BUN 9 02/24/2021 11:57 AM    Sodium 139 02/24/2021 11:57 AM    Potassium 4.0 02/24/2021 11:57 AM    Chloride 104 02/24/2021 11:57 AM    CO2 28 02/24/2021 11:57 AM      Lab Results   Component Value Date/Time    TSH 0.738 06/14/2021 12:00 AM        Assessment/Plan: 1. Type 2 diabetes mellitus with hyperglycemia, without long-term current use of insulin ([address]    2. Graves disease    3. Essential hypertension            Diabetes:    Lab Results   Component Value Date/Time    Hemoglobin A1c 6.8 (H) 02/24/2021 11:57 AM    Hemoglobin A1c (POC) 7.0 07/14/2021 09:06 AM   Metformin  Controlled    Hypertension:  On Lisinopril. Refused statin, discussed the benefits    Vitamin D - dairy intake       Graves disease   Methimazole since4/17  Methimazole: Stopped in March 2021)            There are no Patient Instructions on file for this visit. Thank you for allowing me to participate in the care of this patient.     Larayne Hamman, MD      Patient verbalized understanding

## 2021-08-17 DIAGNOSIS — E11.65 UNCONTROLLED TYPE 2 DIABETES MELLITUS WITH HYPERGLYCEMIA, WITHOUT LONG-TERM CURRENT USE OF INSULIN (HCC): ICD-10-CM

## 2021-08-17 DIAGNOSIS — I10 ESSENTIAL HYPERTENSION: ICD-10-CM

## 2021-08-17 RX ORDER — LISINOPRIL 5 MG/1
TABLET ORAL
Qty: 90 TABLET | Refills: 0 | Status: SHIPPED | OUTPATIENT
Start: 2021-08-17 | End: 2022-01-06 | Stop reason: SDUPTHER

## 2021-08-17 RX ORDER — METFORMIN HYDROCHLORIDE 1000 MG/1
TABLET ORAL
Qty: 180 TABLET | Refills: 0 | Status: SHIPPED | OUTPATIENT
Start: 2021-08-17 | End: 2022-01-06 | Stop reason: SDUPTHER

## 2021-10-13 ENCOUNTER — TELEPHONE (OUTPATIENT)
Dept: FAMILY MEDICINE CLINIC | Age: 56
End: 2021-10-13

## 2021-10-13 NOTE — TELEPHONE ENCOUNTER
Please advise     ----- Message from [facility] sent at 10/13/2021  9:06 AM EDT -----  Regarding: Dr. Yogi King telephone  Contact: [phone]  General Message/Vendor Calls    Caller's first and last name: n/a      Reason for call: Patient has question in reference to Covid booster shot.       Callback required yes/no and why: yes, discuss      Best contact number(s): [phone]      Details to clarify the request: [address] [place] [address], [id number]

## 2021-12-09 LAB
ALBUMIN/CREAT UR: 10 MG/G CREAT (ref 0–29)
BUN SERPL-MCNC: 11 MG/DL (ref 6–24)
BUN/CREAT SERPL: 15 (ref 9–23)
CALCIUM SERPL-MCNC: 10.5 MG/DL (ref 8.7–10.2)
CHLORIDE SERPL-SCNC: 102 MMOL/L (ref 96–106)
CO2 SERPL-SCNC: 25 MMOL/L (ref 20–29)
CREAT SERPL-MCNC: 0.73 MG/DL (ref 0.57–1)
CREAT UR-MCNC: 293.8 MG/DL
EST. AVERAGE GLUCOSE BLD GHB EST-MCNC: 169 MG/DL
GLUCOSE SERPL-MCNC: 163 MG/DL (ref 65–99)
HBA1C MFR BLD: 7.5 % (ref 4.8–5.6)
LDLC SERPL DIRECT ASSAY-MCNC: 58 MG/DL (ref 0–99)
MICROALBUMIN UR-MCNC: 30.8 UG/ML
POTASSIUM SERPL-SCNC: 4.8 MMOL/L (ref 3.5–5.2)
SODIUM SERPL-SCNC: 143 MMOL/L (ref 134–144)
T4 FREE SERPL-MCNC: 2.02 NG/DL (ref 0.82–1.77)
TSH SERPL DL<=0.005 MIU/L-ACNC: 0.02 UIU/ML (ref 0.45–4.5)

## 2021-12-15 ENCOUNTER — OFFICE VISIT (OUTPATIENT)
Dept: ENDOCRINOLOGY | Age: 56
End: 2021-12-15
Payer: COMMERCIAL

## 2021-12-15 VITALS
RESPIRATION RATE: 18 BRPM | BODY MASS INDEX: 27.46 KG/M2 | TEMPERATURE: 98 F | DIASTOLIC BLOOD PRESSURE: 72 MMHG | HEART RATE: 98 BPM | HEIGHT: 63 IN | SYSTOLIC BLOOD PRESSURE: 124 MMHG | OXYGEN SATURATION: 99 % | WEIGHT: 155 LBS

## 2021-12-15 DIAGNOSIS — E11.65 TYPE 2 DIABETES MELLITUS WITH HYPERGLYCEMIA, UNSPECIFIED WHETHER LONG TERM INSULIN USE (HCC): Primary | ICD-10-CM

## 2021-12-15 DIAGNOSIS — E05.00 GRAVES DISEASE: ICD-10-CM

## 2021-12-15 PROCEDURE — 99214 OFFICE O/P EST MOD 30 MIN: CPT | Performed by: INTERNAL MEDICINE

## 2021-12-15 PROCEDURE — 3051F HG A1C>EQUAL 7.0%<8.0%: CPT | Performed by: INTERNAL MEDICINE

## 2021-12-15 RX ORDER — METHIMAZOLE 5 MG/1
5 TABLET ORAL
Qty: 90 TABLET | Refills: 3 | Status: SHIPPED | OUTPATIENT
Start: 2021-12-15

## 2021-12-15 NOTE — LETTER
12/15/2021    Patient: Rickie Piña   YOB: 1965   Date of Visit: 12/15/2021     Nico Gracia MD  [address] [address]  [address] [place]    Dear Nico Gracia MD,      Thank you for referring Ms. Madeleine Villegas to [address] [address] for evaluation. My notes for this consultation are attached. If you have questions, please do not hesitate to call me. I look forward to following your patient along with you.       Sincerely,    Manuela Oneill MD

## 2021-12-15 NOTE — PROGRESS NOTES
Mishel Katz MD        Patient Information  Date:12/15/2021  Name : Lashay Keene 64 y.o.     YOB: 1965         Referred by: Nia Anderson MD         Chief Complaint   Patient presents with    Diabetes       History of Present Illness: Lashay Keene is a 64 y.o. female here for follow up of  Type 2 Diabetes Mellitus. She was diagnosed with diabetes in 2013     TSH is low, free T4 is elevated, no hyperthyroid symptoms    She is on Metformin 1000 mg twice daily   Off nesina  Off methimazole        Wt Readings from Last 3 Encounters:   12/15/21 155 lb (70.3 kg)   07/14/21 154 lb (69.9 kg)   06/07/21 156 lb (70.8 kg)       BP Readings from Last 3 Encounters:   12/15/21 124/72   07/14/21 129/84   06/07/21 135/81           Past Medical History:   Diagnosis Date    Diabetes ([facility] [address]     HTN (hypertension)     Hyperthyroidism      Current Outpatient Medications   Medication Sig    lisinopriL (PRINIVIL, ZESTRIL) 5 mg tablet TAKE ONE TABLET BY MOUTH DAILY    metFORMIN (GLUCOPHAGE) 1,000 mg tablet TAKE ONE TABLET BY MOUTH TWICE A DAY    Lancets misc Test blood glucose once daily     No current facility-administered medications for this visit. No Known Allergies      Review of Systems:  - Per HPI  -     Physical Examination:   Blood pressure 124/72, pulse 98, temperature 98 °F (36.7 °C), temperature source Temporal, resp. rate 18, height 5' 3\" (1.6 m), weight 155 lb (70.3 kg), last menstrual period 10/25/2015, SpO2 99 %. Estimated body mass index is 27.46 kg/m² as calculated from the following:    Height as of this encounter: 5' 3\" (1.6 m). -   Weight as of this encounter: 155 lb (70.3 kg).   - General: pleasant, no distress, good eye contact  - HEENT: no exophthalmos, no periorbital edema, EOMI  - Neck: No thyromegaly  - CVS: S1-S2 regular  - RS: Normal respiratory effort  - Musculoskeletal: no tremors  - Neurological: alert and oriented  - Psychiatric: normal mood and affect  - Skin: Normal color        Data Reviewed:       Lab Results   Component Value Date/Time    Hemoglobin A1c 7.5 (H) 12/08/2021 12:00 AM    Hemoglobin A1c 6.8 (H) 02/24/2021 11:57 AM    Hemoglobin A1c 7.3 (H) 01/17/2020 08:44 AM    Glucose 163 (H) 12/08/2021 12:00 AM    Glucose  11/10/2017 09:22 AM    Microalbumin/Creat ratio (mg/g creat) 29 02/24/2021 11:57 AM    Microalb/Creat ratio (ug/mg creat.) 10 12/08/2021 12:00 AM    Microalbumin,urine random 10.10 02/24/2021 11:57 AM    LDL,Direct 58 12/08/2021 12:00 AM    LDL, calculated 94 02/24/2021 11:57 AM    Creatinine 0.73 12/08/2021 12:00 AM      Lab Results   Component Value Date/Time    Cholesterol, total 201 (H) 02/24/2021 11:57 AM    HDL Cholesterol 83 02/24/2021 11:57 AM    LDL,Direct 58 12/08/2021 12:00 AM    LDL, calculated 94 02/24/2021 11:57 AM    Triglyceride 120 02/24/2021 11:57 AM    CHOL/HDL Ratio 2.4 02/24/2021 11:57 AM     Lab Results   Component Value Date/Time    ALT (SGPT) 46 02/24/2021 11:57 AM    Alk. phosphatase 94 02/24/2021 11:57 AM    Bilirubin, total 0.7 02/24/2021 11:57 AM     Lab Results   Component Value Date/Time    GFR est  12/08/2021 12:00 AM    GFR est non-AA 92 12/08/2021 12:00 AM    Creatinine 0.73 12/08/2021 12:00 AM    BUN 11 12/08/2021 12:00 AM    Sodium 143 12/08/2021 12:00 AM    Potassium 4.8 12/08/2021 12:00 AM    Chloride 102 12/08/2021 12:00 AM    CO2 25 12/08/2021 12:00 AM      Lab Results   Component Value Date/Time    TSH 0.018 (L) 12/08/2021 12:00 AM        Assessment/Plan:     No diagnosis found. Diabetes:    Lab Results   Component Value Date/Time    Hemoglobin A1c 7.5 (H) 12/08/2021 12:00 AM    Hemoglobin A1c (POC) 7.0 07/14/2021 09:06 AM   Metformin  A1c is higher    Hypertension:  On Lisinopril.       Refused statin, understands the benefits    Vitamin D - dairy intake       Graves disease   Methimazole since4/17  Methimazole: Stopped in March 2021)  Relapsed 12/2021, resume methimazole        There are no Patient Instructions on file for this visit. Thank you for allowing me to participate in the care of this patient.     Padmini Banks MD      Patient verbalized understanding

## 2021-12-15 NOTE — PROGRESS NOTES
Ani Hardin is a 64 y.o. female here for   Chief Complaint   Patient presents with    Diabetes       1. Have you been to the ER, urgent care clinic since your last visit? Hospitalized since your last visit? -no    2. Have you seen or consulted any other health care providers outside of the [address] [place] [address] since your last visit?   Include any pap smears or colon screening.-no

## 2022-01-06 DIAGNOSIS — E11.65 UNCONTROLLED TYPE 2 DIABETES MELLITUS WITH HYPERGLYCEMIA, WITHOUT LONG-TERM CURRENT USE OF INSULIN (HCC): ICD-10-CM

## 2022-01-06 DIAGNOSIS — I10 ESSENTIAL HYPERTENSION: ICD-10-CM

## 2022-01-06 RX ORDER — LISINOPRIL 5 MG/1
5 TABLET ORAL DAILY
Qty: 90 TABLET | Refills: 3 | Status: SHIPPED | OUTPATIENT
Start: 2022-01-06

## 2022-01-06 RX ORDER — METFORMIN HYDROCHLORIDE 1000 MG/1
1000 TABLET ORAL 2 TIMES DAILY
Qty: 180 TABLET | Refills: 3 | Status: SHIPPED | OUTPATIENT
Start: 2022-01-06

## 2022-01-06 NOTE — TELEPHONE ENCOUNTER
Patient called stating she is out of  lisinopril and metformin refills she was following up as chandrakant should of sent a request which I do not please proceed with request

## 2022-03-19 PROBLEM — E05.00 GRAVES DISEASE: Status: ACTIVE | Noted: 2017-11-11

## 2022-03-19 PROBLEM — E11.65 TYPE 2 DIABETES MELLITUS WITH HYPERGLYCEMIA, WITHOUT LONG-TERM CURRENT USE OF INSULIN (HCC): Status: ACTIVE | Noted: 2017-04-15

## 2022-03-19 LAB
BUN SERPL-MCNC: 10 MG/DL (ref 6–24)
BUN/CREAT SERPL: 14 (ref 9–23)
CALCIUM SERPL-MCNC: 9.9 MG/DL (ref 8.7–10.2)
CHLORIDE SERPL-SCNC: 100 MMOL/L (ref 96–106)
CO2 SERPL-SCNC: 17 MMOL/L (ref 20–29)
CREAT SERPL-MCNC: 0.69 MG/DL (ref 0.57–1)
EGFR: 102 ML/MIN/1.73
EST. AVERAGE GLUCOSE BLD GHB EST-MCNC: 160 MG/DL
GLUCOSE SERPL-MCNC: 76 MG/DL (ref 65–99)
HBA1C MFR BLD: 7.2 % (ref 4.8–5.6)
INTERPRETATION: NORMAL
POTASSIUM SERPL-SCNC: 4.3 MMOL/L (ref 3.5–5.2)
SODIUM SERPL-SCNC: 141 MMOL/L (ref 134–144)
T4 FREE SERPL-MCNC: 1.28 NG/DL (ref 0.82–1.77)
TSH SERPL DL<=0.005 MIU/L-ACNC: 2.02 UIU/ML (ref 0.45–4.5)

## 2022-03-22 ENCOUNTER — TELEPHONE (OUTPATIENT)
Dept: ENDOCRINOLOGY | Age: 57
End: 2022-03-22

## 2022-03-22 NOTE — TELEPHONE ENCOUNTER
Pt left VM stating she needs methimazole and lisinopril sent to her pharmacy. Called pt back to inform her rx were sent in Dec and Jan with a years worth of refills and she does not need a new rx, just call the pharmacy and get a refill. Pt wants to know if she even till needs the methimazole. She just had labs and has an appt on Friday to discuss but wants to know before she picks up a new bottle.

## 2022-03-23 NOTE — TELEPHONE ENCOUNTER
Per Dr. Farrah Pollard informed pt of the following She has to continue to [facility] methimazole , will discuss at visit. Pt verbalized understanding.

## 2022-03-25 ENCOUNTER — OFFICE VISIT (OUTPATIENT)
Dept: ENDOCRINOLOGY | Age: 57
End: 2022-03-25
Payer: COMMERCIAL

## 2022-03-25 VITALS
TEMPERATURE: 98 F | DIASTOLIC BLOOD PRESSURE: 67 MMHG | SYSTOLIC BLOOD PRESSURE: 101 MMHG | WEIGHT: 150 LBS | BODY MASS INDEX: 26.58 KG/M2 | RESPIRATION RATE: 16 BRPM | OXYGEN SATURATION: 100 % | HEART RATE: 104 BPM | HEIGHT: 63 IN

## 2022-03-25 DIAGNOSIS — E78.2 MIXED HYPERLIPIDEMIA: ICD-10-CM

## 2022-03-25 DIAGNOSIS — E11.65 TYPE 2 DIABETES MELLITUS WITH HYPERGLYCEMIA, UNSPECIFIED WHETHER LONG TERM INSULIN USE (HCC): Primary | ICD-10-CM

## 2022-03-25 DIAGNOSIS — E05.00 GRAVES DISEASE: ICD-10-CM

## 2022-03-25 PROCEDURE — 99214 OFFICE O/P EST MOD 30 MIN: CPT | Performed by: INTERNAL MEDICINE

## 2022-03-25 PROCEDURE — 3051F HG A1C>EQUAL 7.0%<8.0%: CPT | Performed by: INTERNAL MEDICINE

## 2022-03-25 NOTE — PROGRESS NOTES
Osmin Lacy is a 64 y.o. female here for   Chief Complaint   Patient presents with    Diabetes    Thyroid Problem       1. Have you been to the ER, urgent care clinic since your last visit? Hospitalized since your last visit? -no    2. Have you seen or consulted any other health care providers outside of the [address] [place] [address] since your last visit?   Include any pap smears or colon screening.-no

## 2022-03-25 NOTE — LETTER
3/25/2022    Patient: Lorna Sheehan   YOB: 1965   Date of Visit: 3/25/2022     Zafar Nielson MD  [address] [address]  [address] [place]    Dear Zafar Nielson MD,      Thank you for referring Ms. Horacio Alonso to [address] [address] for evaluation. My notes for this consultation are attached. If you have questions, please do not hesitate to call me. I look forward to following your patient along with you.       Sincerely,    Carl Laura MD

## 2022-03-25 NOTE — PROGRESS NOTES
Migue Galvan MD        Patient Information  Date:3/25/2022  Name : Luke Knox 64 y.o.     YOB: 1965         Referred by: Marcy Burnett MD         Chief Complaint   Patient presents with    Diabetes    Thyroid Problem       History of Present Illness: Luke Knox is a 64 y.o. female here for follow up of  Type 2 Diabetes Mellitus. She was diagnosed with diabetes in 2013   She has hyperthyroidism, on methimazole  Lost weight  She is on Metformin 1000 mg twice daily   Off nesina          Wt Readings from Last 3 Encounters:   03/25/22 150 lb (68 kg)   12/15/21 155 lb (70.3 kg)   07/14/21 154 lb (69.9 kg)       BP Readings from Last 3 Encounters:   03/25/22 101/67   12/15/21 124/72   07/14/21 129/84           Past Medical History:   Diagnosis Date    Diabetes ([facility] [address]     HTN (hypertension)     Hyperthyroidism      Current Outpatient Medications   Medication Sig    OTHER Artichoke heart extract daily    lisinopriL (PRINIVIL, ZESTRIL) 5 mg tablet Take 1 Tablet by mouth daily.  metFORMIN (GLUCOPHAGE) 1,000 mg tablet Take 1 Tablet by mouth two (2) times a day.  methIMAzole (TAPAZOLE) 5 mg tablet Take 1 Tablet by mouth every morning.  Lancets misc Test blood glucose once daily     No current facility-administered medications for this visit. No Known Allergies      Review of Systems:  - Per HPI  -     Physical Examination:   Blood pressure 101/67, pulse (!) 104, temperature 98 °F (36.7 °C), temperature source Temporal, resp. rate 16, height 5' 3\" (1.6 m), weight 150 lb (68 kg), last menstrual period 10/25/2015, SpO2 100 %. Estimated body mass index is 26.57 kg/m² as calculated from the following:    Height as of this encounter: 5' 3\" (1.6 m). -   Weight as of this encounter: 150 lb (68 kg).   - General: pleasant, no distress, good eye contact  - HEENT: no exophthalmos, no periorbital edema, EOMI  - Neck: No thyromegaly  - CVS: S1-S2 regular  - RS: Normal respiratory effort  - Musculoskeletal: no tremors  - Neurological: alert and oriented  - Psychiatric: normal mood and affect  - Skin: Normal color        Data Reviewed:       Lab Results   Component Value Date/Time    Hemoglobin A1c 7.2 (H) 03/18/2022 12:00 AM    Hemoglobin A1c 7.5 (H) 12/08/2021 12:00 AM    Hemoglobin A1c 6.8 (H) 02/24/2021 11:57 AM    Glucose 76 03/18/2022 12:00 AM    Glucose  11/10/2017 09:22 AM    Microalbumin/Creat ratio (mg/g creat) 29 02/24/2021 11:57 AM    Microalb/Creat ratio (ug/mg creat.) 10 12/08/2021 12:00 AM    Microalbumin,urine random 10.10 02/24/2021 11:57 AM    LDL,Direct 58 12/08/2021 12:00 AM    LDL, calculated 94 02/24/2021 11:57 AM    Creatinine 0.69 03/18/2022 12:00 AM      Lab Results   Component Value Date/Time    Cholesterol, total 201 (H) 02/24/2021 11:57 AM    HDL Cholesterol 83 02/24/2021 11:57 AM    LDL,Direct 58 12/08/2021 12:00 AM    LDL, calculated 94 02/24/2021 11:57 AM    Triglyceride 120 02/24/2021 11:57 AM    CHOL/HDL Ratio 2.4 02/24/2021 11:57 AM     Lab Results   Component Value Date/Time    ALT (SGPT) 46 02/24/2021 11:57 AM    Alk. phosphatase 94 02/24/2021 11:57 AM    Bilirubin, total 0.7 02/24/2021 11:57 AM     Lab Results   Component Value Date/Time    GFR est  12/08/2021 12:00 AM    GFR est non-AA 92 12/08/2021 12:00 AM    Creatinine 0.69 03/18/2022 12:00 AM    BUN 10 03/18/2022 12:00 AM    Sodium 141 03/18/2022 12:00 AM    Potassium 4.3 03/18/2022 12:00 AM    Chloride 100 03/18/2022 12:00 AM    CO2 17 (L) 03/18/2022 12:00 AM      Lab Results   Component Value Date/Time    TSH 2.020 03/18/2022 12:00 AM        Assessment/Plan:     No diagnosis found. Diabetes:    Lab Results   Component Value Date/Time    Hemoglobin A1c 7.2 (H) 03/18/2022 12:00 AM    Hemoglobin A1c (POC) 7.0 07/14/2021 09:06 AM   Stable  Metformin  Wants to control it with diet    Hypertension:  On Lisinopril.       Refused statin, understands the benefits    Vitamin D - dairy intake       Graves disease   Methimazole since4/17  Methimazole: Stopped in March 2021)  Relapsed 12/2021, on methimazole        There are no Patient Instructions on file for this visit. Thank you for allowing me to participate in the care of this patient.     Tami Bell MD      Patient verbalized understanding

## 2022-06-02 ENCOUNTER — OFFICE VISIT (OUTPATIENT)
Dept: FAMILY MEDICINE CLINIC | Age: 57
End: 2022-06-02
Payer: COMMERCIAL

## 2022-06-02 VITALS
OXYGEN SATURATION: 99 % | RESPIRATION RATE: 16 BRPM | BODY MASS INDEX: 27.07 KG/M2 | DIASTOLIC BLOOD PRESSURE: 77 MMHG | TEMPERATURE: 97.4 F | HEART RATE: 94 BPM | HEIGHT: 63 IN | SYSTOLIC BLOOD PRESSURE: 129 MMHG | WEIGHT: 152.8 LBS

## 2022-06-02 DIAGNOSIS — R13.19 ESOPHAGEAL DYSPHAGIA: ICD-10-CM

## 2022-06-02 DIAGNOSIS — Z00.00 PHYSICAL EXAM: Primary | ICD-10-CM

## 2022-06-02 PROCEDURE — 99396 PREV VISIT EST AGE 40-64: CPT | Performed by: FAMILY MEDICINE

## 2022-06-02 PROCEDURE — 99213 OFFICE O/P EST LOW 20 MIN: CPT | Performed by: FAMILY MEDICINE

## 2022-06-02 NOTE — PROGRESS NOTES
Franco Sykes is a 64 y.o. female      Chief Complaint   Patient presents with    Complete Physical         1. Have you been to the ER, urgent care clinic since your last visit? Hospitalized since your last visit? No       2. Have you seen or consulted any other health care providers outside of the [address] [place] [address] since your last visit? Include any pap smears or colon screening. No     Last Pap 2021     Last colonoscopy past 2 years     .

## 2022-06-02 NOTE — PROGRESS NOTES
Chief Complaint   Patient presents with    Complete Physical     she is a 64y.o. year old female who presents for evalution. Here for physical  As a side note she mentions she feels her food gets stuck at the bottom of the esophagus and takes a few minutes to move on down  This started many years ago  But she has never had this evaluated        Reviewed PmHx, RxHx, FmHx, SocHx, AllgHx and updated and dated in the chart.     Aspirin yes ____   No____ N/A____    Patient Active Problem List    Diagnosis    Graves disease    Type 2 diabetes mellitus with hyperglycemia, without long-term current use of insulin ([facility] [address]    BMI 28.0-28.9,adult    Overweight    Type II diabetes mellitus, uncontrolled    Essential hypertension    Vitamin D deficiency    Overweight(278.02)    Type II or unspecified type diabetes mellitus without mention of complication, uncontrolled       Nurse notes were reviewed and copied and are correct  Review of Systems - negative except as listed above in the HPI    Objective:     Vitals:    06/02/22 0900   BP: 129/77   Pulse: 94   Resp: 16   Temp: 97.4 °F (36.3 °C)   TempSrc: Temporal   SpO2: 99%   Weight: 152 lb 12.8 oz (69.3 kg)   Height: 5' 3\" (1.6 m)       Physical Examination: General appearance - alert, well appearing, and in no distress  Mental status - alert, oriented to person, place, and time  Eyes - pupils equal and reactive, extraocular eye movements intact  Ears - bilateral TM's and external ear canals normal  Neck - supple, no significant adenopathy  Chest - clear to auscultation, no wheezes, rales or rhonchi, symmetric air entry  Heart - normal rate, regular rhythm, normal S1, S2, no murmurs, rubs, clicks or gallops  Abdomen - soft, nontender, nondistended, no masses or organomegaly  Neurological - alert, oriented, normal speech, no focal findings or movement disorder noted  Musculoskeletal - no joint tenderness, deformity or swelling  Extremities - peripheral pulses normal, no pedal edema, no clubbing or cyanosis  Skin - normal coloration and turgor, no rashes, no suspicious skin lesions noted      Assessment/ Plan:   Diagnoses and all orders for this visit:    1. Physical exam  -     METABOLIC PANEL, COMPREHENSIVE; Future    2. Esophageal dysphagia  -     XR BA SWALLOW ESOPHOGRAM; Future  -     METABOLIC PANEL, COMPREHENSIVE; Future     here for a physical and she c/o a separate issue that required evaluation and care  Avoid eating or drinking less than an hour before bedtime      ICD-10-CM ICD-9-CM    1. Physical exam  H16.22 V94.3 METABOLIC PANEL, COMPREHENSIVE      METABOLIC PANEL, COMPREHENSIVE   2. Esophageal dysphagia  R13.19 787.29 XR BA SWALLOW ESOPHOGRAM      METABOLIC PANEL, COMPREHENSIVE      METABOLIC PANEL, COMPREHENSIVE       I have discussed the diagnosis with the patient and the intended plan as seen in the above orders. The patient has received an after-visit summary  if not on mychart and questions were answered concerning future plans. [facility] have [facility] to [facility]s without printing    Medication Side Effects and Warnings were discussed with patient:   Patient Labs were reviewed and or requested:   Patient Past Records were reviewed and or requested: yes        There are no Patient Instructions on file for this visit.

## 2022-06-03 LAB
ALBUMIN SERPL-MCNC: 4.4 G/DL (ref 3.5–5)
ALBUMIN/GLOB SERPL: 1.2 {RATIO} (ref 1.1–2.2)
ALP SERPL-CCNC: 79 U/L (ref 45–117)
ALT SERPL-CCNC: 28 U/L (ref 12–78)
ANION GAP SERPL CALC-SCNC: 6 MMOL/L (ref 5–15)
AST SERPL-CCNC: 14 U/L (ref 15–37)
BILIRUB SERPL-MCNC: 0.4 MG/DL (ref 0.2–1)
BUN SERPL-MCNC: 9 MG/DL (ref 6–20)
BUN/CREAT SERPL: 10 (ref 12–20)
CALCIUM SERPL-MCNC: 9.9 MG/DL (ref 8.5–10.1)
CHLORIDE SERPL-SCNC: 106 MMOL/L (ref 97–108)
CO2 SERPL-SCNC: 28 MMOL/L (ref 21–32)
CREAT SERPL-MCNC: 0.9 MG/DL (ref 0.55–1.02)
GLOBULIN SER CALC-MCNC: 3.8 G/DL (ref 2–4)
GLUCOSE SERPL-MCNC: 136 MG/DL (ref 65–100)
POTASSIUM SERPL-SCNC: 4.5 MMOL/L (ref 3.5–5.1)
PROT SERPL-MCNC: 8.2 G/DL (ref 6.4–8.2)
SODIUM SERPL-SCNC: 140 MMOL/L (ref 136–145)

## 2022-06-28 ENCOUNTER — HOSPITAL ENCOUNTER (OUTPATIENT)
Dept: GENERAL RADIOLOGY | Age: 57
Discharge: HOME | End: 2022-06-28
Attending: FAMILY MEDICINE
Payer: COMMERCIAL

## 2022-06-28 DIAGNOSIS — R13.19 ESOPHAGEAL DYSPHAGIA: ICD-10-CM

## 2022-06-28 PROCEDURE — 74220 X-RAY XM ESOPHAGUS 1CNTRST: CPT

## 2022-07-04 NOTE — PROGRESS NOTES
The esophagus shows there is mild reflux and there is no stricture(narrowing) of the esophagus. Great news!

## 2022-08-12 ENCOUNTER — TRANSCRIBE ORDER (OUTPATIENT)
Dept: SCHEDULING | Age: 57
End: 2022-08-12

## 2022-08-12 DIAGNOSIS — Z12.31 BREAST CANCER SCREENING BY MAMMOGRAM: Primary | ICD-10-CM

## 2022-08-20 LAB
BUN SERPL-MCNC: 13 MG/DL (ref 6–24)
BUN/CREAT SERPL: 16 (ref 9–23)
CALCIUM SERPL-MCNC: 9.6 MG/DL (ref 8.7–10.2)
CHLORIDE SERPL-SCNC: 103 MMOL/L (ref 96–106)
CO2 SERPL-SCNC: 24 MMOL/L (ref 20–29)
CREAT SERPL-MCNC: 0.82 MG/DL (ref 0.57–1)
EGFR: 84 ML/MIN/1.73
EST. AVERAGE GLUCOSE BLD GHB EST-MCNC: 166 MG/DL
GLUCOSE SERPL-MCNC: 158 MG/DL (ref 65–99)
HBA1C MFR BLD: 7.4 % (ref 4.8–5.6)
LDLC SERPL DIRECT ASSAY-MCNC: 93 MG/DL (ref 0–99)
POTASSIUM SERPL-SCNC: 4.5 MMOL/L (ref 3.5–5.2)
SODIUM SERPL-SCNC: 141 MMOL/L (ref 134–144)
T4 FREE SERPL-MCNC: 1.08 NG/DL (ref 0.82–1.77)
TSH SERPL DL<=0.005 MIU/L-ACNC: 1.67 UIU/ML (ref 0.45–4.5)

## 2022-08-23 ENCOUNTER — TRANSCRIBE ORDER (OUTPATIENT)
Dept: EMERGENCY DEPT | Age: 57
End: 2022-08-23

## 2022-08-23 ENCOUNTER — HOSPITAL ENCOUNTER (OUTPATIENT)
Dept: MAMMOGRAPHY | Age: 57
Discharge: HOME | End: 2022-08-23
Attending: FAMILY MEDICINE
Payer: COMMERCIAL

## 2022-08-23 DIAGNOSIS — Z12.31 BREAST CANCER SCREENING BY MAMMOGRAM: ICD-10-CM

## 2022-08-23 PROCEDURE — 77067 SCR MAMMO BI INCL CAD: CPT

## 2022-08-26 ENCOUNTER — OFFICE VISIT (OUTPATIENT)
Dept: ENDOCRINOLOGY | Age: 57
End: 2022-08-26
Payer: COMMERCIAL

## 2022-08-26 VITALS
TEMPERATURE: 97.6 F | RESPIRATION RATE: 16 BRPM | SYSTOLIC BLOOD PRESSURE: 106 MMHG | HEIGHT: 63 IN | OXYGEN SATURATION: 100 % | WEIGHT: 149.2 LBS | HEART RATE: 90 BPM | DIASTOLIC BLOOD PRESSURE: 74 MMHG | BODY MASS INDEX: 26.44 KG/M2

## 2022-08-26 DIAGNOSIS — E78.2 MIXED HYPERLIPIDEMIA: ICD-10-CM

## 2022-08-26 DIAGNOSIS — E05.00 GRAVES DISEASE: ICD-10-CM

## 2022-08-26 DIAGNOSIS — E11.65 TYPE 2 DIABETES MELLITUS WITH HYPERGLYCEMIA, UNSPECIFIED WHETHER LONG TERM INSULIN USE (HCC): Primary | ICD-10-CM

## 2022-08-26 PROCEDURE — 99214 OFFICE O/P EST MOD 30 MIN: CPT | Performed by: INTERNAL MEDICINE

## 2022-08-26 PROCEDURE — 3051F HG A1C>EQUAL 7.0%<8.0%: CPT | Performed by: INTERNAL MEDICINE

## 2022-08-26 RX ORDER — GLUCOSAM/CHONDRO/HERB 149/HYAL 750-100 MG
1 TABLET ORAL DAILY
COMMUNITY

## 2022-08-26 NOTE — PROGRESS NOTES
Enid Chávez MD        Patient Information  Date:8/26/2022  Name : Brandon López 64 y.o.     YOB: 1965         Referred by: Kevin Cortez MD         Chief Complaint   Patient presents with    Follow-up     5 mo, DM, thyroid       History of Present Illness: Brandon López is a 64 y.o. female here for follow up of  Type 2 Diabetes Mellitus. She was diagnosed with diabetes in 2013   She has hyperthyroidism, on methimazole  Weight has been stable  A1c more than 7    She is on Metformin 1000 mg twice daily   Off nesina          Wt Readings from Last 3 Encounters:   08/26/22 149 lb 3.2 oz (67.7 kg)   06/02/22 152 lb 12.8 oz (69.3 kg)   03/25/22 150 lb (68 kg)       BP Readings from Last 3 Encounters:   08/26/22 106/74   06/02/22 129/77   03/25/22 101/67           Past Medical History:   Diagnosis Date    Di[facility] ([facility]     HTN (hypertension)     Hyperthyroidism      Current Outpatient Medications   Medication Sig    omega 3-DHA-EPA-fish oil 1,000 mg (120 mg-180 mg) capsule Take 1 Capsule by mouth daily. lisinopriL (PRINIVIL, ZESTRIL) 5 mg tablet Take 1 Tablet by mouth daily. metFORMIN (GLUCOPHAGE) 1,000 mg tablet Take 1 Tablet by mouth two (2) times a day. methIMAzole (TAPAZOLE) 5 mg tablet Take 1 Tablet by mouth every morning. Lancets misc Test blood glucose once daily     No current facility-administered medications for this visit. No Known Allergies      Review of Systems:  Per HPI      Physical Examination:   Blood pressure 106/74, pulse 90, temperature 97.6 °F (36.4 °C), temperature source Temporal, resp. rate 16, height 5' 3\" (1.6 m), weight 149 lb 3.2 oz (67.7 kg), last menstrual period 10/25/2015, SpO2 100 %. Estimated body mass index is 26.43 kg/m² as calculated from the following:    Height as of this encounter: 5' 3\" (1.6 m). Weight as of this encounter: 149 lb 3.2 oz (67.7 kg).   General: pleasant, no distress, good eye contact  HEENT: no exophthalmos, no periorbital edema, EOMI  Neck: No thyromegaly  CVS: S1-S2 regular  RS: Normal respiratory effort  Musculoskeletal: no tremors  Neurological: alert and oriented  Psychiatric: normal mood and affect  Skin: Normal color        Data Reviewed:       Lab Results   Component Value Date/Time    Hemoglobin A1c 7.4 (H) 08/19/2022 12:00 AM    Hemoglobin A1c 7.2 (H) 03/18/2022 12:00 AM    Hemoglobin A1c 7.5 (H) 12/08/2021 12:00 AM    Glucose 158 (H) 08/19/2022 12:00 AM    Glucose  11/10/2017 09:22 AM    Microalbumin/Creat ratio (mg/g creat) 29 02/24/2021 11:57 AM    Microalb/Creat ratio (ug/mg creat.) 10 12/08/2021 12:00 AM    Microalbumin,urine random 10.10 02/24/2021 11:57 AM    LDL,Direct 93 08/19/2022 12:00 AM    LDL, calculated 94 02/24/2021 11:57 AM    Creatinine 0.82 08/19/2022 12:00 AM      Lab Results   Component Value Date/Time    Cholesterol, total 201 (H) 02/24/2021 11:57 AM    HDL Cholesterol 83 02/24/2021 11:57 AM    LDL,Direct 93 08/19/2022 12:00 AM    LDL, calculated 94 02/24/2021 11:57 AM    Triglyceride 120 02/24/2021 11:57 AM    CHOL/HDL Ratio 2.4 02/24/2021 11:57 AM     Lab Results   Component Value Date/Time    ALT (SGPT) 28 06/02/2022 09:59 AM    Alk. phosphatase 79 06/02/2022 09:59 AM    Bilirubin, total 0.4 06/02/2022 09:59 AM     Lab Results   Component Value Date/Time    GFR est AA >60 06/02/2022 09:59 AM    GFR est non-AA >60 06/02/2022 09:59 AM    Creatinine 0.82 08/19/2022 12:00 AM    BUN 13 08/19/2022 12:00 AM    Sodium 141 08/19/2022 12:00 AM    Potassium 4.5 08/19/2022 12:00 AM    Chloride 103 08/19/2022 12:00 AM    CO2 24 08/19/2022 12:00 AM      Lab Results   Component Value Date/Time    TSH 1.670 08/19/2022 12:00 AM        Assessment/Plan:     1. Type 2 diabetes mellitus with hyperglycemia, unspecified whether long term insulin use ([facility]    2. Mixed hyperlipidemia    3.  Graves disease            Diabetes:    Lab Results   Component Value Date/Time    Hemoglobin A1c 7.4 (H) 08/19/2022 12:00 AM    Hemoglobin A1c (POC) 7.0 07/14/2021 09:06 AM   Stable  Metformin  Wants to control it with diet    Hypertension:  On Lisinopril. Refused statin, understands the benefits    Vitamin D - dairy intake       Graves disease   Methimazole since4/17  Methimazole: Stopped in March 2021)  Relapsed 12/2021, on methimazole        There are no Patient Instructions on file for this visit. Thank you for allowing me to participate in the care of this patient.     Raphael Villalobos MD      Patient verbalized understanding

## 2022-08-26 NOTE — PROGRESS NOTES
Room 7    Identified pt with two pt identifiers(name and ). Reviewed record in preparation for visit and have obtained necessary documentation. All patient medications has been reviewed. Chief Complaint   Patient presents with    Follow-up     5 mo, DM, thyroid         Vitals:    22 0905   BP: 106/74   Pulse: 90   Resp: 16   Temp: 97.6 °F (36.4 °C)   TempSrc: Temporal   SpO2: 100%   Weight: 149 lb 3.2 oz (67.7 kg)   Height: 5' 3\" (1.6 m)   PainSc:   0 - No pain   LMP: 10/25/2015         Lab Results   Component Value Date/Time    Hemoglobin A1c 7.4 (H) 2022 12:00 AM    Hemoglobin A1c (POC) 7.0 2021 09:06 AM       Coordination of Care Questionnaire:     1. Have you been to the ER, urgent care clinic since your last visit? Hospitalized since your last visit? No    2. Have you seen or consulted any other health care providers outside of the [address] [place] [address] since your last visit? Include any pap smears or colon screening.  No

## 2022-09-26 ENCOUNTER — TELEPHONE (OUTPATIENT)
Dept: OBGYN CLINIC | Age: 57
End: 2022-09-26

## 2022-09-26 NOTE — TELEPHONE ENCOUNTER
Patient called to be scheduled for her annual visit-spw patient and scheduled her appt for 10/12/22 @ 10:15AM.

## 2022-10-12 ENCOUNTER — OFFICE VISIT (OUTPATIENT)
Dept: OBGYN CLINIC | Age: 57
End: 2022-10-12
Payer: COMMERCIAL

## 2022-10-12 VITALS
WEIGHT: 155 LBS | HEART RATE: 88 BPM | RESPIRATION RATE: 16 BRPM | HEIGHT: 63 IN | BODY MASS INDEX: 27.46 KG/M2 | OXYGEN SATURATION: 98 % | SYSTOLIC BLOOD PRESSURE: 147 MMHG | DIASTOLIC BLOOD PRESSURE: 92 MMHG

## 2022-10-12 DIAGNOSIS — Z01.419 GYNECOLOGIC EXAM NORMAL: Primary | ICD-10-CM

## 2022-10-12 DIAGNOSIS — Z12.4 ENCOUNTER FOR SCREENING FOR MALIGNANT NEOPLASM OF CERVIX: ICD-10-CM

## 2022-10-12 PROCEDURE — 99396 PREV VISIT EST AGE 40-64: CPT | Performed by: OBSTETRICS & GYNECOLOGY

## 2022-10-12 NOTE — PATIENT INSTRUCTIONS
Breast Self-Exam: Care Instructions  Your Care Instructions     A breast self-exam is when you check your breasts for lumps or changes. This regular exam helps you learn how your breasts normally look and feel. Most breast problems or changes are not because of cancer. Breast self-exam is not a substitute for a mammogram. Having regular breast exams by your doctor and regular mammograms improve your chances of finding any problems with your breasts. Some women set a time each month to do a step-by-step breast self-exam. Other women like a less formal system. They might look at their breasts as they brush their teeth, or feel their breasts once in a while in the shower. If you notice a change in your breast, tell your doctor. Follow-up care is a key part of your treatment and safety. Be sure to make and go to all appointments, and call your doctor if you are having problems. It's also a good idea to know your test results and keep a list of the medicines you take. How do you do a breast self-exam?  The best time to examine your breasts is usually one week after your menstrual period begins. Your breasts should not be tender then. If you do not have periods, you might do your exam on a day of the month that is easy to remember. To examine your breasts:  Remove all your clothes above the waist and lie down. When you are lying down, your breast tissue spreads evenly over your chest wall, which makes it easier to feel all your breast tissue. Use the pads--not the fingertips--of the 3 middle fingers of your left hand to check your right breast. Move your fingers slowly in small coin-sized circles that overlap. Use three levels of pressure to feel of all your breast tissue. Use light pressure to feel the tissue close to the skin surface. Use medium pressure to feel a little deeper. Use firm pressure to feel your tissue close to your breastbone and ribs.  Use each pressure level to feel your breast tissue before moving on to the next spot. Check your entire breast, moving up and down as if following a strip from the collarbone to the bra line, and from the armpit to the ribs. Repeat until you have covered the entire breast.  Repeat this procedure for your left breast, using the pads of the 3 middle fingers of your right hand. To examine your breasts while in the shower:  Place one arm over your head and lightly soap your breast on that side. Using the pads of your fingers, gently move your hand over your breast (in the strip pattern described above), feeling carefully for any lumps or changes. Repeat for the other breast.  Have your doctor inspect anything you notice to see if you need further testing. Where can you learn more? Go to http://www.gray.com/  Enter P148 in the search box to learn more about \"Breast Self-Exam: Care Instructions. \"  Current as of: September 8, 2021               Content Version: 13.2  © 2006-2022 [employer]wise[employer]. Care instructions adapted under license by [employer] (which disclaims liability or warranty for this information). If you have questions about a medical condition or this instruction, always ask your healthcare professional. [place] [address] any warranty or liability for your use of this information.

## 2022-10-15 LAB
CYTOLOGIST CVX/VAG CYTO: NORMAL
CYTOLOGY CVX/VAG DOC CYTO: NORMAL
CYTOLOGY CVX/VAG DOC THIN PREP: NORMAL
DX ICD CODE: NORMAL
LABCORP, 190119: NORMAL
Lab: NORMAL
OTHER STN SPEC: NORMAL
STAT OF ADQ CVX/VAG CYTO-IMP: NORMAL

## 2022-10-16 NOTE — PROGRESS NOTES
Hi Abel is a 62 y.o. female, , Patient's last menstrual period was 10/25/2015., who presents today for the following:  Chief Complaint   Patient presents with    Annual Exam        No Known Allergies    Current Outpatient Medications   Medication Sig    lisinopriL (PRINIVIL, ZESTRIL) 5 mg tablet Take 1 Tablet by mouth daily.  metFORMIN (GLUCOPHAGE) 1,000 mg tablet Take 1 Tablet by mouth two (2) times a day.  methIMAzole (TAPAZOLE) 5 mg tablet Take 1 Tablet by mouth every morning.  Lancets misc Test blood glucose once daily    omega 3-DHA-EPA-fish oil 1,000 mg (120 mg-180 mg) capsule Take 1 Capsule by mouth daily. (Patient not taking: Reported on 10/12/2022)     No current facility-administered medications for this visit. Past Medical History:   Diagnosis Date    Diabetes ([address]     HTN (hypertension)     Hyperthyroidism        Past Surgical History:   Procedure Laterality Date    HX  SECTION      HX ORTHOPAEDIC  1998    right tibia/removal of cyst in bone       Family History   Problem Relation Age of Onset    Diabetes Mother     Hypertension Mother     Cancer Sister         breast    Breast Cancer Sister 55       Social History     Socioeconomic History    Marital status:      Spouse name: Not on file    Number of children: Not on file    Years of education: Not on file    Highest education level: Not on file   Occupational History    Not on file   Tobacco Use    Smoking status: Never    Smokeless tobacco: Never   Vaping Use    Vaping Use: Never used   Substance and Sexual Activity    Alcohol use:  Yes     Alcohol/week: 2.0 standard drinks     Types: 2 Shots of liquor per week     Comment: weekends socially    Drug use: Yes     Types: Marijuana     Comment: occ    Sexual activity: Yes     Partners: Male     Birth control/protection: None   Other Topics Concern    Not on file   Social History Narrative    Not on file     Social Determinants of Health     Financial Resource Strain: Not on file   Food Insecurity: Not on file   Transportation Needs: Not on file   Physical Activity: Not on file   Stress: Not on file   Social Connections: Not on file   Intimate Partner Violence: Not on file   Housing Stability: Not on file         Annual Exam  Annual     Review of Systems   Constitutional: Negative. Respiratory: Negative. Cardiovascular: Negative. Gastrointestinal: Negative. Genitourinary: Negative. Musculoskeletal: Negative. Skin: Negative. Neurological: Negative. Endo/Heme/Allergies: Negative. Psychiatric/Behavioral: Negative. All other systems reviewed and are negative. BP (!) 147/92 (BP 1 Location: Right upper arm, BP Patient Position: Sitting)   Pulse 88   Resp 16   Ht 5' 3\" (1.6 m)   Wt 155 lb (70.3 kg)   LMP 10/25/2015   SpO2 98%   BMI 27.46 kg/m²    OBGyn Exam   Constitutional:   General Appearance: healthy-appearing, well-nourished, and well-developed; Level of Distress: NAD. Ambulation: ambulating normally. Psychiatric:   Insight: good judgement. Mental Status: normal mood and affect and active and alert. Orientation: to time, place, and person. Memory: recent memory normal and remote memory normal.     Head: Head: normocephalic and atraumatic. Neck:   Neck: supple, FROM, trachea midline, and no masses. Lymph Nodes: no cervical LAD, supraclavicular LAD, axillary LAD, or inguinal LAD. Lungs:   Respiratory effort: no dyspnea. Cardiovascular:   Pulses including femoral / pedal: normal throughout. Breast: Breast: no masses or abnormal secretions and normal appearance. Abdomen: : no tenderness, guarding, masses, rebound tenderness, or CVA tenderness and non-distended.        Female :   External genitalia: no lesions or rash and normal.   Vagina: moist mucosa;   Cervix: no discharge, inflammation, or cervical motion tenderness   Uterus: midline, smooth, and non-tender; Adnexae: no adnexal mass or tenderness and size WNL. Bladder and Urethra: normal bladder and urethra (except where noted). 1. Gynecologic exam normal    - PAP IG, RFX APTIMA HPV ASCUS ([id number])    2.  Encounter for screening for malignant neoplasm of cervix        Follow-up and Dispositions    Return in about 1 year (around 10/12/2023) for annual.

## 2022-12-20 DIAGNOSIS — E11.65 TYPE 2 DIABETES MELLITUS WITH HYPERGLYCEMIA, UNSPECIFIED WHETHER LONG TERM INSULIN USE (HCC): ICD-10-CM

## 2022-12-20 RX ORDER — METHIMAZOLE 5 MG/1
TABLET ORAL
Qty: 60 TABLET | Refills: 3 | Status: SHIPPED | OUTPATIENT
Start: 2022-12-20

## 2023-01-19 DIAGNOSIS — I10 ESSENTIAL HYPERTENSION: ICD-10-CM

## 2023-01-19 DIAGNOSIS — E11.65 UNCONTROLLED TYPE 2 DIABETES MELLITUS WITH HYPERGLYCEMIA, WITHOUT LONG-TERM CURRENT USE OF INSULIN (HCC): ICD-10-CM

## 2023-01-19 RX ORDER — METFORMIN HYDROCHLORIDE 1000 MG/1
TABLET ORAL
Qty: 180 TABLET | Refills: 3 | Status: SHIPPED | OUTPATIENT
Start: 2023-01-19

## 2023-01-19 RX ORDER — LISINOPRIL 5 MG/1
TABLET ORAL
Qty: 90 TABLET | Refills: 3 | Status: SHIPPED | OUTPATIENT
Start: 2023-01-19

## 2023-02-01 ENCOUNTER — VIRTUAL VISIT (OUTPATIENT)
Dept: ENDOCRINOLOGY | Age: 58
End: 2023-02-01
Payer: COMMERCIAL

## 2023-02-01 DIAGNOSIS — E05.90 HYPERTHYROIDISM: ICD-10-CM

## 2023-02-01 DIAGNOSIS — E78.2 MIXED HYPERLIPIDEMIA: ICD-10-CM

## 2023-02-01 DIAGNOSIS — E11.65 TYPE 2 DIABETES MELLITUS WITH HYPERGLYCEMIA, UNSPECIFIED WHETHER LONG TERM INSULIN USE (HCC): ICD-10-CM

## 2023-02-01 DIAGNOSIS — E05.00 GRAVES DISEASE: Primary | ICD-10-CM

## 2023-02-01 DIAGNOSIS — I10 ESSENTIAL HYPERTENSION: ICD-10-CM

## 2023-02-01 RX ORDER — METHIMAZOLE 5 MG/1
2.5 TABLET ORAL DAILY
Qty: 30 TABLET | Refills: 5 | Status: SHIPPED | OUTPATIENT
Start: 2023-02-01

## 2023-02-01 NOTE — PROGRESS NOTES
Versie Boxer is a 62 y.o. female here for   Chief Complaint   Patient presents with    Diabetes    Thyroid Problem   1st /108  2nd /86    1. Have you been to the ER or an urgent care clinic since your last visit?  - no    2. Have you been hospitalized since your last visit? - no    3. Have you seen or consulted any other health care providers outside of the [address] [place] [address] since your last visit?   Include any pap smears or colon screening.- no

## 2023-02-01 NOTE — PROGRESS NOTES
Caroline Mcdermott MD        Patient Information  Date:2/1/2023  Name : Lorna Sheehan 62 y.o.     YOB: 1965         Referred by: Kristie Iraheta MD     Pursuant to the emergency declaration under the [address] waiver authority and the [facility] and [employer] Act, this Virtual  Visit was conducted, with patient's consent, to reduce the patient's risk of exposure to COVID-19 . Patient  is aware that this is a billable encounter and is responsible for copays/deductibles       Services were provided through a video synchronous discussion virtually to substitute for in-person clinic visit. Place of service: Provider : Office  Patient: Home    Chief Complaint   Patient presents with    Diabetes    Thyroid Problem       History of Present Illness: Lorna Sheehan is a 62 y.o. female here for follow up of  Type 2 Diabetes Mellitus. 2/1/23  On methimazole 5 mg daily - will decrease to 2.5mg daily  No hypoglycemia  No lump in neck  No choking sensation  No tremors    Prior history  She was diagnosed with diabetes in 2013   She has hyperthyroidism, on methimazole  Weight has been stable  A1c more than 7    She is on Metformin 1000 mg twice daily   Off nesina          Wt Readings from Last 3 Encounters:   10/12/22 155 lb (70.3 kg)   08/26/22 149 lb 3.2 oz (67.7 kg)   06/02/22 152 lb 12.8 oz (69.3 kg)       BP Readings from Last 3 Encounters:   10/12/22 (!) 147/92   08/26/22 106/74   06/02/22 129/77           Past Medical History:   Diagnosis Date    Diabetes ([facility]     HTN (hypertension)     Hyperthyroidism      Current Outpatient Medications   Medication Sig    MANGANESE PO Take 10 mg by mouth daily. OTHER 500 mg daily. Tri Chromium supplement    OTHER 1 Tablet daily.  Gluco fort supplement    metFORMIN (GLUCOPHAGE) 1,000 mg tablet TAKE ONE TABLET BY MOUTH TWICE A DAY lisinopriL (PRINIVIL, ZESTRIL) 5 mg tablet TAKE ONE TABLET BY MOUTH DAILY    methIMAzole (TAPAZOLE) 5 mg tablet TAKE ONE TABLET BY MOUTH EVERY MORNING    omega 3-DHA-EPA-fish oil 1,000 mg (120 mg-180 mg) capsule Take  by mouth daily. Liquid form, same dosage    Lancets misc Test blood glucose once daily     No current facility-administered medications for this visit. No Known Allergies      Review of Systems:  Per HPI      Physical Examination:   Last menstrual period 10/25/2015. Estimated body mass index is 27.46 kg/m² as calculated from the following:    Height as of 10/12/22: 5' 3\" (1.6 m). Weight as of 10/12/22: 155 lb (70.3 kg). General: pleasant, no distress, good eye contact  HEENT: no exophthalmos, no periorbital edema, EOMI  Neck: No visible thyromegaly  RS: Normal respiratory effort  Musculoskeletal: no tremors  Neurological: alert and oriented  Psychiatric: normal mood and affect  Skin: Normal color        Data Reviewed:       Lab Results   Component Value Date/Time    Hemoglobin A1c 7.2 (H) 01/25/2023 10:43 AM    Hemoglobin A1c 7.4 (H) 08/19/2022 12:00 AM    Hemoglobin A1c 7.2 (H) 03/18/2022 12:00 AM    Glucose 138 (H) 01/25/2023 10:43 AM    Glucose  11/10/2017 09:22 AM    Microalbumin/Creat ratio (mg/g creat) 19 01/25/2023 10:43 AM    Microalbumin,urine random 6.05 01/25/2023 10:43 AM    LDL,Direct 99 01/25/2023 10:43 AM    LDL, calculated 94 02/24/2021 11:57 AM    Creatinine 0.79 01/25/2023 10:43 AM      Lab Results   Component Value Date/Time    Cholesterol, total 201 (H) 02/24/2021 11:57 AM    HDL Cholesterol 83 02/24/2021 11:57 AM    LDL,Direct 99 01/25/2023 10:43 AM    LDL, calculated 94 02/24/2021 11:57 AM    Triglyceride 120 02/24/2021 11:57 AM    CHOL/HDL Ratio 2.4 02/24/2021 11:57 AM     Lab Results   Component Value Date/Time    ALT (SGPT) 28 06/02/2022 09:59 AM    Alk.  phosphatase 79 06/02/2022 09:59 AM    Bilirubin, total 0.4 06/02/2022 09:59 AM     Lab Results   Component Value Date/Time    GFR est AA >60 06/02/2022 09:59 AM    GFR est non-AA >60 06/02/2022 09:59 AM    Creatinine 0.79 01/25/2023 10:43 AM    BUN 8 01/25/2023 10:43 AM    Sodium 141 01/25/2023 10:43 AM    Potassium 4.2 01/25/2023 10:43 AM    Chloride 106 01/25/2023 10:43 AM    CO2 28 01/25/2023 10:43 AM      Lab Results   Component Value Date/Time    TSH 1.09 01/25/2023 10:43 AM        Assessment/Plan:     No diagnosis found. Type II diabetes mellitus:    Lab Results   Component Value Date/Time    Hemoglobin A1c 7.2 (H) 01/25/2023 10:43 AM    Hemoglobin A1c (POC) 7.0 07/14/2021 09:06 AM   Stable  Metformin  Wants to control it with diet    Hypertension:  On Lisinopril. Refused statin, understands the benefits    Vitamin D - dairy intake       Graves disease   Methimazole since4/17  Methimazole: Stopped in March 2021)  Relapsed 12/2021, on methimazole  Methimazole 2.5 mg January 2023      There are no Patient Instructions on file for this visit. Thank you for allowing me to participate in the care of this patient.     Willem Alfaro MD      Patient verbalized understanding

## 2023-04-22 DIAGNOSIS — E05.00 GRAVES DISEASE: ICD-10-CM

## 2023-04-22 DIAGNOSIS — E11.65 TYPE 2 DIABETES MELLITUS WITH HYPERGLYCEMIA, UNSPECIFIED WHETHER LONG TERM INSULIN USE (HCC): Primary | ICD-10-CM

## 2023-04-23 DIAGNOSIS — E05.00 GRAVES DISEASE: ICD-10-CM

## 2023-04-23 DIAGNOSIS — E11.65 TYPE 2 DIABETES MELLITUS WITH HYPERGLYCEMIA, UNSPECIFIED WHETHER LONG TERM INSULIN USE (HCC): Primary | ICD-10-CM

## 2023-04-24 DIAGNOSIS — E11.65 TYPE 2 DIABETES MELLITUS WITH HYPERGLYCEMIA, UNSPECIFIED WHETHER LONG TERM INSULIN USE (HCC): Primary | ICD-10-CM

## 2023-04-24 DIAGNOSIS — E05.00 GRAVES DISEASE: ICD-10-CM

## 2023-05-18 RX ORDER — METHIMAZOLE 5 MG/1
2.5 TABLET ORAL DAILY
COMMUNITY
Start: 2023-02-01

## 2023-05-18 RX ORDER — LISINOPRIL 5 MG/1
1 TABLET ORAL DAILY
COMMUNITY
Start: 2023-01-19

## 2023-05-18 RX ORDER — LANCETS 30 GAUGE
EACH MISCELLANEOUS
COMMUNITY
Start: 2017-03-07 | End: 2023-07-18 | Stop reason: SDUPTHER

## 2023-07-18 ENCOUNTER — OFFICE VISIT (OUTPATIENT)
Age: 58
End: 2023-07-18
Payer: COMMERCIAL

## 2023-07-18 VITALS
TEMPERATURE: 97.9 F | SYSTOLIC BLOOD PRESSURE: 131 MMHG | WEIGHT: 146.9 LBS | RESPIRATION RATE: 18 BRPM | HEIGHT: 63 IN | HEART RATE: 88 BPM | OXYGEN SATURATION: 100 % | DIASTOLIC BLOOD PRESSURE: 77 MMHG | BODY MASS INDEX: 26.03 KG/M2

## 2023-07-18 DIAGNOSIS — E11.65 TYPE 2 DIABETES MELLITUS WITH HYPERGLYCEMIA, UNSPECIFIED WHETHER LONG TERM INSULIN USE (HCC): Primary | ICD-10-CM

## 2023-07-18 DIAGNOSIS — I10 ESSENTIAL HYPERTENSION: ICD-10-CM

## 2023-07-18 DIAGNOSIS — E05.00 GRAVES DISEASE: ICD-10-CM

## 2023-07-18 DIAGNOSIS — E78.2 MIXED HYPERLIPIDEMIA: ICD-10-CM

## 2023-07-18 PROCEDURE — 99214 OFFICE O/P EST MOD 30 MIN: CPT | Performed by: INTERNAL MEDICINE

## 2023-07-18 PROCEDURE — 3075F SYST BP GE 130 - 139MM HG: CPT | Performed by: INTERNAL MEDICINE

## 2023-07-18 PROCEDURE — 3044F HG A1C LEVEL LT 7.0%: CPT | Performed by: INTERNAL MEDICINE

## 2023-07-18 PROCEDURE — 3078F DIAST BP <80 MM HG: CPT | Performed by: INTERNAL MEDICINE

## 2023-07-18 RX ORDER — LANCETS 30 GAUGE
EACH MISCELLANEOUS
Qty: 100 EACH | Refills: 3 | Status: SHIPPED | OUTPATIENT
Start: 2023-07-18

## 2023-07-18 NOTE — PROGRESS NOTES
Marija Miller is a 62 y.o. female here for   Chief Complaint   Patient presents with    Diabetes       1. Have you been to the ER or an urgent care clinic since your last visit?  - no    2. Have you been hospitalized since your last visit? - no    3. Have you seen or consulted any other health care providers outside of the [address] [place] [address] since your last visit?   Include any pap smears or colon screening.- no

## 2023-07-18 NOTE — PROGRESS NOTES
Inga Meraz MD            Patient Information   Date:2/1/2023   Name : Yvan Pinon 62 y.o.       YOB: 1965           Referred by: Giana Montero MD                History of Present Illness: Yvan Pinon is a 62 y.o. female here  for follow up of  Type 2 Diabetes Mellitus which was diagnosed in 2013. She is on metformin, tolerating well  On methimazole 2.5 mg daily  She would like to start berberine   No hypoglycemia   No lump in neck        Prior history   She was diagnosed with diabetes in 2013    She has hyperthyroidism, on methimazole   Weight has been stable  A1c more than 7     She is on Metformin 1000 mg twice daily    Off nesina           Review of Systems:    Per HPI          Physical Examination:      General: pleasant, no distress, good eye contact  HEENT: no exophthalmos, no periorbital edema, EOMI  Neck: No visible thyromegaly   RS: Normal respiratory effort  Musculoskeletal: no tremors  Neurological: alert and oriented  Psychiatric: normal mood and affect  Skin: Normal color            Data Reviewed:          Lab Results   Component Value Date    GFRAA >60 06/02/2022        Lab Results   Component Value Date    LABA1C 6.7 (H) 07/11/2023    LABA1C 7.2 (H) 01/25/2023    LABA1C 7.4 (H) 08/19/2022         Lab Results   Component Value Date    LDLCALC 94 02/24/2021    TRIG 120 02/24/2021    HDL 83 02/24/2021     07/11/2023    K 4.6 07/11/2023     07/11/2023    CO2 28 07/11/2023    BUN 6 07/11/2023    CREATININE 0.73 07/11/2023    GFRAA >60 06/02/2022    GLUCOSE 150 (H) 07/11/2023    CALCIUM 9.4 07/11/2023    MALBCR 19 01/25/2023                          Assessment/Plan:         1. Type 2 diabetes mellitus with hyperglycemia, unspecified whether long term insulin use ([address] )    2. Mixed hyperlipidemia    3.  Essential hypertension          Type II diabetes mellitus:         Stable   Metformin

## 2023-09-29 LAB — HBA1C MFR BLD HPLC: 7 %

## 2024-01-30 ENCOUNTER — OFFICE VISIT (OUTPATIENT)
Age: 59
End: 2024-01-30
Payer: COMMERCIAL

## 2024-01-30 VITALS
SYSTOLIC BLOOD PRESSURE: 144 MMHG | RESPIRATION RATE: 18 BRPM | HEART RATE: 91 BPM | HEIGHT: 63 IN | OXYGEN SATURATION: 99 % | WEIGHT: 152.1 LBS | DIASTOLIC BLOOD PRESSURE: 81 MMHG | BODY MASS INDEX: 26.95 KG/M2 | TEMPERATURE: 98 F

## 2024-01-30 DIAGNOSIS — E05.00 GRAVES DISEASE: ICD-10-CM

## 2024-01-30 DIAGNOSIS — E11.65 TYPE 2 DIABETES MELLITUS WITH HYPERGLYCEMIA, UNSPECIFIED WHETHER LONG TERM INSULIN USE (HCC): Primary | ICD-10-CM

## 2024-01-30 DIAGNOSIS — E05.00 GRAVES DISEASE: Primary | ICD-10-CM

## 2024-01-30 PROCEDURE — 3077F SYST BP >= 140 MM HG: CPT | Performed by: INTERNAL MEDICINE

## 2024-01-30 PROCEDURE — 99214 OFFICE O/P EST MOD 30 MIN: CPT | Performed by: INTERNAL MEDICINE

## 2024-01-30 PROCEDURE — 3079F DIAST BP 80-89 MM HG: CPT | Performed by: INTERNAL MEDICINE

## 2024-01-30 RX ORDER — METHIMAZOLE 5 MG/1
2.5 TABLET ORAL DAILY
Qty: 45 TABLET | Refills: 3 | Status: SHIPPED | OUTPATIENT
Start: 2024-01-30

## 2024-01-30 NOTE — TELEPHONE ENCOUNTER
Pt asked if she would ever come off Metformin or if we will ever lower her dose. Stated she's been on the same dose since seeing her previous doc.

## 2024-01-30 NOTE — PROGRESS NOTES
Olivia Zamora is a 58 y.o. female here for   Chief Complaint   Patient presents with    Diabetes       1. Have you been to the ER, urgent care clinic since your last visit?  Hospitalized since your last visit? - No    2. Have you seen or consulted any other health care providers outside of the [facility] since your last visit?  Include any pap smears or colon screening.- PCP

## 2024-01-30 NOTE — PROGRESS NOTES
[facility] DIABETES AND ENDOCRINOLOGY                 Iram Lou MD            Patient Information   Date:2/1/2023   Name : Olivia Zamora 57 y.o.       YOB: 1965           Referred by: Rhonda Yarbrough MD                History of Present Illness: Olivia Zamora is  here  for follow up of  Type 2 Diabetes Mellitus which was diagnosed in 2013.      She is on metformin, tolerating well  On methimazole 2.5 mg daily, had labs in November  No tachycardia, no extreme weight loss   No hypoglycemia   No lump in neck        Prior history   She was diagnosed with diabetes in 2013    She has hyperthyroidism, on methimazole   Weight has been stable  A1c more than 7     She is on Metformin 1000 mg twice daily    Off nesina              Physical Examination:      General: pleasant, no distress, good eye contact  HEENT: no exophthalmos, no periorbital edema, EOMI  Neck: No visible thyromegaly   RS: Normal respiratory effort  Musculoskeletal: no tremors  Neurological: alert and oriented  Psychiatric: normal mood and affect  Skin: Normal color            Data Reviewed:          Lab Results   Component Value Date    GFRAA >60 06/02/2022        Lab Results   Component Value Date    LABA1C 6.7 (H) 07/11/2023    LABA1C 7.2 (H) 01/25/2023    LABA1C 7.4 (H) 08/19/2022         Lab Results   Component Value Date    LDLCALC 94 02/24/2021    TRIG 120 02/24/2021    HDL 83 02/24/2021     07/11/2023    K 4.6 07/11/2023     07/11/2023    CO2 28 07/11/2023    BUN 6 07/11/2023    CREATININE 0.73 07/11/2023    GFRAA >60 06/02/2022    GLUCOSE 150 (H) 07/11/2023    CALCIUM 9.4 07/11/2023    MALBCR 19 01/25/2023                          Assessment/Plan:         1. Type 2 diabetes mellitus with hyperglycemia, unspecified whether long term insulin use (HCC)    2. Graves disease            Type II diabetes mellitus:       Lab Results   Component Value Date    LABA1C 6.7 (H) 07/11/2023       Stable   Metformin

## 2024-04-24 ENCOUNTER — NURSE ONLY (OUTPATIENT)
Age: 59
End: 2024-04-24

## 2024-04-24 DIAGNOSIS — E11.65 TYPE 2 DIABETES MELLITUS WITH HYPERGLYCEMIA, UNSPECIFIED WHETHER LONG TERM INSULIN USE (HCC): ICD-10-CM

## 2024-04-24 DIAGNOSIS — E05.00 GRAVES DISEASE: ICD-10-CM

## 2024-05-01 ENCOUNTER — OFFICE VISIT (OUTPATIENT)
Age: 59
End: 2024-05-01
Payer: COMMERCIAL

## 2024-05-01 VITALS
OXYGEN SATURATION: 100 % | TEMPERATURE: 98.7 F | BODY MASS INDEX: 27.6 KG/M2 | DIASTOLIC BLOOD PRESSURE: 76 MMHG | RESPIRATION RATE: 16 BRPM | HEIGHT: 62 IN | WEIGHT: 150 LBS | SYSTOLIC BLOOD PRESSURE: 122 MMHG | HEART RATE: 87 BPM

## 2024-05-01 DIAGNOSIS — E05.00 GRAVES DISEASE: ICD-10-CM

## 2024-05-01 DIAGNOSIS — E11.65 TYPE 2 DIABETES MELLITUS WITH HYPERGLYCEMIA, UNSPECIFIED WHETHER LONG TERM INSULIN USE (HCC): ICD-10-CM

## 2024-05-01 DIAGNOSIS — E11.65 TYPE 2 DIABETES MELLITUS WITH HYPERGLYCEMIA, WITHOUT LONG-TERM CURRENT USE OF INSULIN (HCC): ICD-10-CM

## 2024-05-01 DIAGNOSIS — E11.65 TYPE 2 DIABETES MELLITUS WITH HYPERGLYCEMIA, WITHOUT LONG-TERM CURRENT USE OF INSULIN (HCC): Primary | ICD-10-CM

## 2024-05-01 DIAGNOSIS — I10 ESSENTIAL HYPERTENSION: ICD-10-CM

## 2024-05-01 DIAGNOSIS — I10 ESSENTIAL HYPERTENSION: Primary | ICD-10-CM

## 2024-05-01 LAB
ANION GAP SERPL CALC-SCNC: 8 MMOL/L (ref 5–15)
BUN SERPL-MCNC: 10 MG/DL (ref 6–20)
BUN/CREAT SERPL: 11 (ref 12–20)
CALCIUM SERPL-MCNC: 10.1 MG/DL (ref 8.5–10.1)
CHLORIDE SERPL-SCNC: 104 MMOL/L (ref 97–108)
CO2 SERPL-SCNC: 26 MMOL/L (ref 21–32)
CREAT SERPL-MCNC: 0.93 MG/DL (ref 0.55–1.02)
CREAT UR-MCNC: 76.9 MG/DL
EST. AVERAGE GLUCOSE BLD GHB EST-MCNC: 154 MG/DL
GLUCOSE SERPL-MCNC: 196 MG/DL (ref 65–100)
HBA1C MFR BLD: 7 % (ref 4–5.6)
MICROALBUMIN UR-MCNC: 0.56 MG/DL
MICROALBUMIN/CREAT UR-RTO: 7 MG/G (ref 0–30)
POTASSIUM SERPL-SCNC: 3.7 MMOL/L (ref 3.5–5.1)
SODIUM SERPL-SCNC: 138 MMOL/L (ref 136–145)
T4 FREE SERPL-MCNC: 1 NG/DL (ref 0.8–1.5)
TSH SERPL DL<=0.05 MIU/L-ACNC: 1.19 UIU/ML (ref 0.36–3.74)

## 2024-05-01 PROCEDURE — 99215 OFFICE O/P EST HI 40 MIN: CPT | Performed by: INTERNAL MEDICINE

## 2024-05-01 PROCEDURE — 3078F DIAST BP <80 MM HG: CPT | Performed by: INTERNAL MEDICINE

## 2024-05-01 PROCEDURE — 3051F HG A1C>EQUAL 7.0%<8.0%: CPT | Performed by: INTERNAL MEDICINE

## 2024-05-01 PROCEDURE — 3074F SYST BP LT 130 MM HG: CPT | Performed by: INTERNAL MEDICINE

## 2024-05-01 RX ORDER — LISINOPRIL 5 MG/1
5 TABLET ORAL DAILY
Qty: 90 TABLET | Refills: 3 | Status: SHIPPED | OUTPATIENT
Start: 2024-05-01

## 2024-05-01 NOTE — PROGRESS NOTES
[facility]                 Iram Lou MD            Patient Information   Date:2/1/2023   Name : Olivia Zamora 57 y.o.       YOB: 1965           Referred by: Rhonda Yarbrough MD                History of Present Illness: Olivia Zamora is  here  for follow up of  Type 2 Diabetes Mellitus which was diagnosed in 2013.      She is on metformin, tolerating well, wants to come off metformin, has some concerns about long-term safety  On methimazole 2.5 mg daily, labs showed biochemical euthyroidism  No tachycardia, no extreme weight loss   No hypoglycemia   No lump in neck        Prior history   She was diagnosed with diabetes in 2013    She has hyperthyroidism, on methimazole   Weight has been stable  A1c more than 7     She is on Metformin 1000 mg twice daily    Off nesina              Physical Examination:      General: pleasant, no distress, good eye contact  HEENT: no exophthalmos, no periorbital edema, EOMI  Neck: No visible thyromegaly   RS: Normal respiratory effort  Musculoskeletal: no tremors  Neurological: alert and oriented  Psychiatric: normal mood and affect  Skin: Normal color            Data Reviewed:          Lab Results   Component Value Date    GFR >60 07/11/2023    GFRAA >60 06/02/2022        Lab Results   Component Value Date    LABA1C 6.7 (H) 07/11/2023    LABA1C 7.2 (H) 01/25/2023    LABA1C 7.4 (H) 08/19/2022         Lab Results   Component Value Date    TRIG 120 02/24/2021    HDL 83 02/24/2021     07/11/2023    K 4.6 07/11/2023     07/11/2023    CO2 28 07/11/2023    BUN 6 07/11/2023    CREATININE 0.73 07/11/2023    GFRAA >60 06/02/2022    GLUCOSE 150 (H) 07/11/2023    CALCIUM 9.4 07/11/2023    MALBCR 19 01/25/2023                          Assessment/Plan:         1. Type 2 diabetes mellitus with hyperglycemia, without long-term current use of insulin (HCC)    2. Essential hypertension    3. Graves disease            Type II

## 2024-07-26 NOTE — TELEPHONE ENCOUNTER
Patient just wants the numbers before appt. . Wants a call today. Klisyri Counseling:  I discussed with the patient the risks of Klisyri including but not limited to erythema, scaling, itching, weeping, crusting, and pain.

## 2024-08-15 ENCOUNTER — TELEPHONE (OUTPATIENT)
Age: 59
End: 2024-08-15

## 2024-08-15 NOTE — TELEPHONE ENCOUNTER
Informed pt of Dr. Lou's note. Pt verbalized understanding with no further questions or concerns at this time.

## 2024-10-25 NOTE — PROGRESS NOTES
Unruly Brennan is a 48 y.o. female here for   Chief Complaint   Patient presents with    Diabetes    Thyroid Problem       Functional glucose monitor and record keeping system? - yes  Eye exam within last year? - on file  Foot exam within last year? - due    1. Have you been to the ER, urgent care clinic since your last visit? Hospitalized since your last visit? -no    2. Have you seen or consulted any other health care providers outside of the [address] [place] [address] since your last visit?   Include any pap smears or colon screening.-no [facility]  Neurology  [address], [address]  [place] [address]  Phone: [phone]  Fax:     [facility]  Neurosurgery  [address] [place] [address], [address]  [place] [address]  Phone: [phone]  Fax:

## 2024-10-29 ENCOUNTER — TELEPHONE (OUTPATIENT)
Age: 59
End: 2024-10-29

## 2024-10-29 NOTE — TELEPHONE ENCOUNTER
Returned pts call; lvm for pt to give the office a call back to schedule an appt with Dr. Greer.ccm

## 2025-08-22 DIAGNOSIS — I10 ESSENTIAL HYPERTENSION: ICD-10-CM

## 2025-08-22 DIAGNOSIS — E11.65 TYPE 2 DIABETES MELLITUS WITH HYPERGLYCEMIA, UNSPECIFIED WHETHER LONG TERM INSULIN USE (HCC): ICD-10-CM

## 2025-08-22 DIAGNOSIS — E11.65 TYPE 2 DIABETES MELLITUS WITH HYPERGLYCEMIA, WITHOUT LONG-TERM CURRENT USE OF INSULIN (HCC): ICD-10-CM

## 2025-08-22 RX ORDER — LISINOPRIL 5 MG/1
5 TABLET ORAL DAILY
Qty: 90 TABLET | Refills: 3 | OUTPATIENT
Start: 2025-08-22

## 2025-08-26 ENCOUNTER — OFFICE VISIT (OUTPATIENT)
Age: 60
End: 2025-08-26
Payer: COMMERCIAL

## 2025-08-26 VITALS
DIASTOLIC BLOOD PRESSURE: 73 MMHG | HEIGHT: 63 IN | HEART RATE: 76 BPM | TEMPERATURE: 98.2 F | OXYGEN SATURATION: 100 % | WEIGHT: 146.9 LBS | BODY MASS INDEX: 26.03 KG/M2 | SYSTOLIC BLOOD PRESSURE: 124 MMHG

## 2025-08-26 DIAGNOSIS — E55.9 VITAMIN D DEFICIENCY: ICD-10-CM

## 2025-08-26 DIAGNOSIS — E05.00 GRAVES DISEASE: ICD-10-CM

## 2025-08-26 DIAGNOSIS — I10 ESSENTIAL HYPERTENSION: ICD-10-CM

## 2025-08-26 DIAGNOSIS — E11.65 TYPE 2 DIABETES MELLITUS WITH HYPERGLYCEMIA, WITHOUT LONG-TERM CURRENT USE OF INSULIN (HCC): ICD-10-CM

## 2025-08-26 DIAGNOSIS — E11.65 TYPE 2 DIABETES MELLITUS WITH HYPERGLYCEMIA, WITHOUT LONG-TERM CURRENT USE OF INSULIN (HCC): Primary | ICD-10-CM

## 2025-08-26 DIAGNOSIS — E11.65 TYPE 2 DIABETES MELLITUS WITH HYPERGLYCEMIA, UNSPECIFIED WHETHER LONG TERM INSULIN USE (HCC): ICD-10-CM

## 2025-08-26 PROBLEM — R79.89 LOW VITAMIN D LEVEL: Status: ACTIVE | Noted: 2024-12-18

## 2025-08-26 PROBLEM — E72.11 HYPERHOMOCYSTEINEMIA: Status: ACTIVE | Noted: 2023-10-16

## 2025-08-26 PROBLEM — R87.619 ATYPICAL GLANDULAR CELLS OF UNDETERMINED SIGNIFICANCE (AGUS) ON CERVICAL PAP SMEAR: Status: ACTIVE | Noted: 2025-08-26

## 2025-08-26 PROBLEM — N84.1 ENDOCERVICAL POLYP: Status: ACTIVE | Noted: 2025-08-26

## 2025-08-26 PROBLEM — E05.90 HYPERTHYROIDISM: Status: ACTIVE | Noted: 2023-09-28

## 2025-08-26 PROCEDURE — 99215 OFFICE O/P EST HI 40 MIN: CPT | Performed by: INTERNAL MEDICINE

## 2025-08-26 PROCEDURE — 3074F SYST BP LT 130 MM HG: CPT | Performed by: INTERNAL MEDICINE

## 2025-08-26 PROCEDURE — 3078F DIAST BP <80 MM HG: CPT | Performed by: INTERNAL MEDICINE

## 2025-08-26 RX ORDER — LISINOPRIL 5 MG/1
5 TABLET ORAL DAILY
Qty: 90 TABLET | Refills: 1 | Status: SHIPPED | OUTPATIENT
Start: 2025-08-26